# Patient Record
Sex: FEMALE | Race: WHITE | Employment: OTHER | URBAN - METROPOLITAN AREA
[De-identification: names, ages, dates, MRNs, and addresses within clinical notes are randomized per-mention and may not be internally consistent; named-entity substitution may affect disease eponyms.]

---

## 2017-01-23 ENCOUNTER — OP HISTORICAL/CONVERTED ENCOUNTER (OUTPATIENT)
Dept: OTHER | Age: 67
End: 2017-01-23

## 2017-08-16 ENCOUNTER — OP HISTORICAL/CONVERTED ENCOUNTER (OUTPATIENT)
Dept: OTHER | Age: 67
End: 2017-08-16

## 2017-09-12 ENCOUNTER — OP HISTORICAL/CONVERTED ENCOUNTER (OUTPATIENT)
Dept: OTHER | Age: 67
End: 2017-09-12

## 2017-09-15 ENCOUNTER — OP HISTORICAL/CONVERTED ENCOUNTER (OUTPATIENT)
Dept: OTHER | Age: 67
End: 2017-09-15

## 2018-06-22 ENCOUNTER — OP HISTORICAL/CONVERTED ENCOUNTER (OUTPATIENT)
Dept: OTHER | Age: 68
End: 2018-06-22

## 2018-06-26 ENCOUNTER — IP HISTORICAL/CONVERTED ENCOUNTER (OUTPATIENT)
Dept: OTHER | Age: 68
End: 2018-06-26

## 2018-08-17 ENCOUNTER — OP HISTORICAL/CONVERTED ENCOUNTER (OUTPATIENT)
Dept: OTHER | Age: 68
End: 2018-08-17

## 2020-02-05 ENCOUNTER — OP HISTORICAL/CONVERTED ENCOUNTER (OUTPATIENT)
Dept: OTHER | Age: 70
End: 2020-02-05

## 2020-02-24 ENCOUNTER — OP HISTORICAL/CONVERTED ENCOUNTER (OUTPATIENT)
Dept: OTHER | Age: 70
End: 2020-02-24

## 2020-03-02 ENCOUNTER — OP HISTORICAL/CONVERTED ENCOUNTER (OUTPATIENT)
Dept: OTHER | Age: 70
End: 2020-03-02

## 2020-07-15 PROBLEM — H92.09 OTALGIA: Status: ACTIVE | Noted: 2020-07-15

## 2020-07-15 PROBLEM — J30.9 ALLERGIC RHINITIS: Status: ACTIVE | Noted: 2020-07-15

## 2020-07-15 PROBLEM — K14.6 BURNING MOUTH SYNDROME: Status: ACTIVE | Noted: 2020-07-15

## 2020-08-03 VITALS
TEMPERATURE: 97.7 F | HEIGHT: 66 IN | HEART RATE: 72 BPM | SYSTOLIC BLOOD PRESSURE: 164 MMHG | BODY MASS INDEX: 36.35 KG/M2 | OXYGEN SATURATION: 96 % | DIASTOLIC BLOOD PRESSURE: 80 MMHG | WEIGHT: 226.2 LBS

## 2020-08-03 RX ORDER — ETANERCEPT 50 MG/ML
SOLUTION SUBCUTANEOUS
COMMUNITY

## 2020-08-03 RX ORDER — FUROSEMIDE 20 MG/1
TABLET ORAL DAILY
COMMUNITY

## 2020-08-03 RX ORDER — SUCRALFATE 1 G/10ML
SUSPENSION ORAL 4 TIMES DAILY
COMMUNITY

## 2020-08-03 RX ORDER — SILVER SULFADIAZINE 10 G/1000G
CREAM TOPICAL DAILY
COMMUNITY

## 2020-08-03 RX ORDER — HYDROXYCHLOROQUINE SULFATE 200 MG/1
200 TABLET, FILM COATED ORAL DAILY
COMMUNITY

## 2020-08-03 RX ORDER — TRIAMCINOLONE ACETONIDE 1 MG/G
PASTE DENTAL 2 TIMES DAILY
COMMUNITY

## 2020-08-03 RX ORDER — ONDANSETRON 4 MG/1
4 TABLET, FILM COATED ORAL
COMMUNITY

## 2020-08-03 RX ORDER — BETAMETHASONE DIPROPIONATE 0.5 MG/G
CREAM TOPICAL 2 TIMES DAILY
COMMUNITY

## 2020-08-03 RX ORDER — TRIAMCINOLONE ACETONIDE 1 MG/G
CREAM TOPICAL 2 TIMES DAILY
COMMUNITY

## 2020-08-03 RX ORDER — ALBUTEROL SULFATE 90 UG/1
AEROSOL, METERED RESPIRATORY (INHALATION)
COMMUNITY

## 2020-08-03 RX ORDER — CHOLESTYRAMINE LIGHT 4 G/5.7G
POWDER, FOR SUSPENSION ORAL
COMMUNITY

## 2020-08-03 RX ORDER — MECLIZINE HYDROCHLORIDE 25 MG/1
TABLET ORAL
COMMUNITY

## 2020-08-03 RX ORDER — LIFITEGRAST 50 MG/ML
SOLUTION/ DROPS OPHTHALMIC
COMMUNITY

## 2020-08-03 RX ORDER — LACTULOSE 10 G/15ML
SOLUTION ORAL; RECTAL 3 TIMES DAILY
COMMUNITY

## 2020-08-03 RX ORDER — FAMOTIDINE 20 MG/1
20 TABLET, FILM COATED ORAL 2 TIMES DAILY
COMMUNITY

## 2020-08-03 RX ORDER — CARVEDILOL 3.12 MG/1
TABLET ORAL 2 TIMES DAILY WITH MEALS
COMMUNITY

## 2020-08-03 RX ORDER — SACUBITRIL AND VALSARTAN 24; 26 MG/1; MG/1
1 TABLET, FILM COATED ORAL 2 TIMES DAILY
COMMUNITY

## 2020-08-03 RX ORDER — LEVOTHYROXINE SODIUM 25 UG/1
TABLET ORAL
COMMUNITY

## 2020-08-03 RX ORDER — METOCLOPRAMIDE 5 MG/1
5 TABLET ORAL
COMMUNITY

## 2020-08-03 RX ORDER — BUDESONIDE AND FORMOTEROL FUMARATE DIHYDRATE 160; 4.5 UG/1; UG/1
2 AEROSOL RESPIRATORY (INHALATION)
COMMUNITY

## 2020-08-03 RX ORDER — HYDROXYZINE 25 MG/1
TABLET, FILM COATED ORAL
COMMUNITY
End: 2021-05-07 | Stop reason: SDUPTHER

## 2020-08-03 RX ORDER — TRAZODONE HYDROCHLORIDE 50 MG/1
TABLET ORAL
COMMUNITY

## 2020-08-03 RX ORDER — MONTELUKAST SODIUM 10 MG/1
10 TABLET ORAL DAILY
COMMUNITY

## 2020-08-03 RX ORDER — SUCRALFATE 1 G/1
1 TABLET ORAL 4 TIMES DAILY
COMMUNITY

## 2020-08-03 RX ORDER — DEXLANSOPRAZOLE 60 MG/1
CAPSULE, DELAYED RELEASE ORAL
COMMUNITY

## 2020-08-03 RX ORDER — DICLOFENAC SODIUM 10 MG/G
GEL TOPICAL 4 TIMES DAILY
COMMUNITY

## 2020-08-11 ENCOUNTER — OFFICE VISIT (OUTPATIENT)
Dept: PODIATRY | Age: 70
End: 2020-08-11
Payer: MEDICARE

## 2020-08-11 VITALS
WEIGHT: 203.6 LBS | HEIGHT: 66 IN | TEMPERATURE: 98.2 F | HEART RATE: 88 BPM | DIASTOLIC BLOOD PRESSURE: 80 MMHG | SYSTOLIC BLOOD PRESSURE: 126 MMHG | BODY MASS INDEX: 32.72 KG/M2 | OXYGEN SATURATION: 97 %

## 2020-08-11 DIAGNOSIS — M77.41 METATARSALGIA OF BOTH FEET: ICD-10-CM

## 2020-08-11 DIAGNOSIS — M20.42 HAMMERTOE OF LEFT FOOT: Primary | ICD-10-CM

## 2020-08-11 DIAGNOSIS — G62.9 NEUROPATHY: ICD-10-CM

## 2020-08-11 DIAGNOSIS — M77.42 METATARSALGIA OF BOTH FEET: ICD-10-CM

## 2020-08-11 PROCEDURE — 99214 OFFICE O/P EST MOD 30 MIN: CPT | Performed by: PODIATRIST

## 2020-08-11 RX ORDER — PREGABALIN 50 MG/1
50 CAPSULE ORAL 3 TIMES DAILY
Qty: 90 CAP | Refills: 2 | Status: SHIPPED | OUTPATIENT
Start: 2020-08-11 | End: 2021-09-07

## 2020-08-17 ENCOUNTER — OFFICE VISIT (OUTPATIENT)
Dept: ENT CLINIC | Age: 70
End: 2020-08-17
Payer: MEDICARE

## 2020-08-17 VITALS
HEIGHT: 66 IN | TEMPERATURE: 97.1 F | BODY MASS INDEX: 32.95 KG/M2 | SYSTOLIC BLOOD PRESSURE: 120 MMHG | OXYGEN SATURATION: 97 % | HEART RATE: 84 BPM | RESPIRATION RATE: 18 BRPM | WEIGHT: 205 LBS | DIASTOLIC BLOOD PRESSURE: 70 MMHG

## 2020-08-17 DIAGNOSIS — H92.02 OTALGIA OF LEFT EAR: ICD-10-CM

## 2020-08-17 DIAGNOSIS — J30.1 NON-SEASONAL ALLERGIC RHINITIS DUE TO POLLEN: ICD-10-CM

## 2020-08-17 DIAGNOSIS — K14.6 BURNING MOUTH SYNDROME: Primary | ICD-10-CM

## 2020-08-17 PROCEDURE — 99213 OFFICE O/P EST LOW 20 MIN: CPT | Performed by: OTOLARYNGOLOGY

## 2020-08-17 NOTE — PROGRESS NOTES
Subjective:    Sherly Carty   72 y.o.   1950     HPI     Location - ear, nose    Quality - allergies, ear pain    Severity -  Mild/moderate    Duration - years    Timing - ongoing    Context - pt on IT, every other week now with concentrate, doing well nasal sore is better; having some left ear pain from her shoulder/neck pains    Modifying Features - allergies    Associated symptoms/signs -  Burning mouth      Review of Systems  Review of Systems   Constitutional: Negative for chills and fever. HENT: Positive for ear pain and tinnitus. Negative for hearing loss and nosebleeds. Eyes: Negative for blurred vision and double vision. Respiratory: Positive for shortness of breath. Negative for cough and sputum production. Cardiovascular: Negative for chest pain and palpitations. Gastrointestinal: Negative for heartburn, nausea and vomiting. Musculoskeletal: Positive for joint pain. Negative for neck pain. Skin: Negative. Neurological: Negative for dizziness, speech change, weakness and headaches. Endo/Heme/Allergies: Negative for environmental allergies. Does not bruise/bleed easily. Psychiatric/Behavioral: Negative for memory loss. The patient does not have insomnia. 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     Otalgia 7/15/2020    Other and unspecified hyperlipidemia     Other chronic pulmonary heart diseases     Other pulmonary embolism and infarction     2007/08    Tremors of nervous system      Prior to Admission medications    Medication Sig Start Date End Date Taking? Authorizing Provider   pregabalin (LYRICA) 50 mg capsule Take 1 Cap by mouth three (3) times daily. Max Daily Amount: 150 mg. 8/11/20   Eugene Latham DPM   betamethasone dipropionate (DIPROSONE) 0.05 % topical cream Apply  to affected area two (2) times a day. Provider, Historical   sucralfate (Carafate) 100 mg/mL suspension Take  by mouth four (4) times daily. Provider, Historical   carvediloL (COREG) 3.125 mg tablet Take  by mouth two (2) times daily (with meals). Provider, Historical   Cholestyramine-Aspartame (Cholestyramine Light) 4 gram powder Take  by mouth three (3) times daily (with meals). Provider, Historical   lactulose (Constulose) 10 gram/15 mL solution Take  by mouth three (3) times daily. Provider, Historical   ivabradine (Corlanor) 5 mg tablet Take  by mouth two (2) times daily (with meals). Provider, Historical   dexlansoprazole (Dexilant) 60 mg CpDB capsule (delayed release) Take  by mouth. Provider, Historical   diclofenac (VOLTAREN) 1 % gel Apply  to affected area four (4) times daily. Provider, Historical   etanercept (EnbreL) 50 mg/mL (1 mL) injection by SubCUTAneous route. Provider, Historical   sacubitriL-valsartan Narendra Duel) 24-26 mg tablet Take 1 Tab by mouth two (2) times a day. Provider, Historical   famotidine (PEPCID) 20 mg tablet Take 20 mg by mouth two (2) times a day. Provider, Historical   furosemide (LASIX) 20 mg tablet Take  by mouth daily. Provider, Historical   hydrOXYchloroQUINE (PLAQUENIL) 200 mg tablet Take 200 mg by mouth daily. Provider, Historical   hydrOXYzine HCL (ATARAX) 25 mg tablet Take  by mouth three (3) times daily as needed. Provider, Historical   levothyroxine (SYNTHROID) 25 mcg tablet Take  by mouth Daily (before breakfast).     Provider, Historical   meclizine (ANTIVERT) 25 mg tablet Take  by mouth three (3) times daily as needed for Dizziness. Provider, Historical   metoclopramide HCl (REGLAN) 5 mg tablet Take 5 mg by mouth Before breakfast, lunch, and dinner. Provider, Historical   montelukast (SINGULAIR) 10 mg tablet Take 10 mg by mouth daily. Provider, Historical   mirabegron ER (Myrbetriq) 50 mg ER tablet Take 50 mg by mouth daily. Provider, Historical   ondansetron hcl (ZOFRAN) 4 mg tablet Take 4 mg by mouth every eight (8) hours as needed for Nausea or Vomiting. Provider, Historical   silver sulfADIAZINE (SSD) 1 % topical cream Apply  to affected area daily. Provider, Historical   budesonide-formoteroL (Symbicort) 160-4.5 mcg/actuation HFAA Take 2 Puffs by inhalation. Provider, Historical   sucralfate (CARAFATE) 1 gram tablet Take 1 g by mouth four (4) times daily. Provider, Historical   traZODone (DESYREL) 50 mg tablet Take  by mouth nightly. Provider, Historical   triamcinolone acetonide (KENALOG) 0.1 % dental paste by Dental route two (2) times a day. Provider, Historical   triamcinolone acetonide (KENALOG) 0.1 % topical cream Apply  to affected area two (2) times a day. use thin layer    Provider, Historical   albuterol (Ventolin HFA) 90 mcg/actuation inhaler Take  by inhalation. Provider, Historical   rivaroxaban (Xarelto) 20 mg tab tablet Take  by mouth daily. Provider, Historical   lifitegrast (Xiidra) 5 % dpet Apply  to eye. Provider, Historical   DIOVAN 160 mg tablet TAKE ONE (1) TABLET, BY MOUTH, DAILY. 9/18/14   Eva Mcfarlane NP   cyclobenzaprine (FLEXERIL) 10 mg tablet Take  by mouth three (3) times daily as needed. 1/2 to 1 daily as needed    Provider, Historical   Hydrochlorothiazide 12.5 mg tablet Take 12.5 mg by mouth daily as needed. 4/25/13   Berhane Troncoso MD   HYDROcodone-acetaminophen 5-500 mg cap Take  by mouth as needed. Provider, Historical   escitalopram (LEXAPRO) 10 mg tablet Take 20 mg by mouth daily.     Provider, Historical   primidone (MYSOLINE) 50 mg tablet Take 250 mg by mouth two (2) times a day. Provider, Historical   zolpidem (AMBIEN) 10 mg tablet Take  by mouth nightly as needed. 1/2 to 1 at bedtime as needed    Provider, Historical   celecoxib (CELEBREX) 200 mg capsule Take  by mouth two (2) times a day. Provider, Historical   simvastatin (ZOCOR) 40 mg tablet Take  by mouth nightly. Provider, Historical   lansoprazole (PREVACID) 30 mg capsule Take  by mouth Daily (before breakfast). Provider, Historical   CALCIUM CARB/VIT D3/MINERALS (CALCIUM-VITAMIN D PO) Take 50,000 mg by mouth every seven (7) days. Provider, Historical   aspirin 81 mg tablet Take 81 mg by mouth. Provider, Historical   allopurinol (ZYLOPRIM) 300 mg tablet Take 100 mg by mouth two (2) times a day. Provider, Historical            Objective:     Visit Vitals  /70 (BP 1 Location: Left arm, BP Patient Position: Sitting)   Pulse 84   Temp 97.1 °F (36.2 °C) (Oral)   Resp 18   Ht 5' 6\" (1.676 m)   Wt 205 lb (93 kg)   SpO2 97%   BMI 33.09 kg/m²        Physical Exam  Vitals signs reviewed. Constitutional:       General: She is awake. She is not in acute distress. Appearance: Normal appearance. She is well-groomed. She is obese. HENT:      Head: Normocephalic and atraumatic. Jaw: There is normal jaw occlusion. No trismus, tenderness or malocclusion. Salivary Glands: Right salivary gland is not diffusely enlarged or tender. Left salivary gland is not diffusely enlarged or tender. Right Ear: Hearing, tympanic membrane, ear canal and external ear normal.      Left Ear: Hearing, tympanic membrane, ear canal and external ear normal.      Ears:      Villalobos exam findings: does not lateralize. Right Rinne: AC > BC. Left Rinne: AC > BC. Comments: Small area of bruising inferior left canal     Nose: No nasal deformity, septal deviation or mucosal edema. Right Nostril: No epistaxis. Left Nostril: No epistaxis.       Right Turbinates: Not enlarged, swollen or pale. Left Turbinates: Not enlarged, swollen or pale. Right Sinus: No maxillary sinus tenderness or frontal sinus tenderness. Left Sinus: No maxillary sinus tenderness or frontal sinus tenderness. Mouth/Throat:      Lips: Pink. No lesions. Mouth: Mucous membranes are moist. No oral lesions. Dentition: Normal dentition. No dental caries. Tongue: No lesions. Palate: No mass and lesions. Pharynx: Oropharynx is clear. Uvula midline. No oropharyngeal exudate or posterior oropharyngeal erythema. Tonsils: No tonsillar exudate. 0 on the right. 0 on the left. Eyes:      General: Vision grossly intact. Extraocular Movements: Extraocular movements intact. Right eye: No nystagmus. Left eye: No nystagmus. Conjunctiva/sclera: Conjunctivae normal.      Pupils: Pupils are equal, round, and reactive to light. Neck:      Musculoskeletal: No edema or erythema. Thyroid: No thyroid mass, thyromegaly or thyroid tenderness. Trachea: Trachea and phonation normal. No tracheal tenderness or tracheal deviation. Cardiovascular:      Rate and Rhythm: Normal rate and regular rhythm. Pulmonary:      Effort: Pulmonary effort is normal. No respiratory distress. Breath sounds: No stridor. Musculoskeletal: Normal range of motion. General: No swelling or tenderness. Lymphadenopathy:      Cervical: No cervical adenopathy. Skin:     General: Skin is warm and dry. Findings: No lesion or rash. Neurological:      General: No focal deficit present. Mental Status: She is alert and oriented to person, place, and time. Mental status is at baseline. Cranial Nerves: Cranial nerves are intact. Coordination: Romberg sign negative. Gait: Gait is intact. Comments: Negative Hallpike   Psychiatric:         Mood and Affect: Mood normal.         Behavior: Behavior normal. Behavior is cooperative. Assessment/Plan:     Encounter Diagnoses   Name Primary?  Burning mouth syndrome Yes    Non-seasonal allergic rhinitis due to pollen     Otalgia of left ear      Exam clear today  Pain may be from small bruising in canal but more likely referred from shoulder pain  Ok to extend IT to every 3 weeks, will need new serum    No orders of the defined types were placed in this encounter. Follow-up and Dispositions    · Return in about 6 months (around 2/17/2021).

## 2020-08-17 NOTE — LETTER
8/17/20 Patient: Amarjit Alvarado YOB: 1950 Date of Visit: 8/17/2020 Jose Spears MD 
[address] [facility] [place] [address] VIA Facsimile: [phone] Dear Jose Spears MD, Thank you for referring Ms. Donovan Bañuelos to [facility] EAR, NOSE, THROAT AND ALLERGY CARE for evaluation. My notes for this consultation are attached. If you have questions, please do not hesitate to call me. I look forward to following your patient along with you. Sincerely, Justice Issa MD

## 2020-08-18 ENCOUNTER — TELEPHONE (OUTPATIENT)
Dept: ENT CLINIC | Age: 70
End: 2020-08-18

## 2020-08-20 NOTE — PROGRESS NOTES
HISTORY OF PRESENT ILLNESS  Talha Malcolm is a 72 y.o. female is being seen in the office as a new patient complaining of burning/tingling to her feet and also multiple pedal deformities. Patient denies any recent trauma. She states imaging has not been performed to interrogate the area. She states the pain rises to the level of 8 out of 10 and is localized to both of her big toes. She states the pain is exacerbated with close toed shoes and ambulation. She denies any openings in the skin. She denies any local/systemic signs of infection. She denies any other lower extremity complaints    Review of Systems   Constitutional: Negative. HENT: Negative. Eyes: Negative. Respiratory: Negative. Cardiovascular: Positive for leg swelling. Gastrointestinal: Positive for heartburn and nausea. Genitourinary: Negative. Musculoskeletal: Positive for back pain and joint pain. Skin: Negative. Neurological: Positive for tingling and sensory change. Endo/Heme/Allergies: Bruises/bleeds easily. Psychiatric/Behavioral: Positive for depression. The patient is nervous/anxious. All other systems reviewed and are negative. Past Medical History:   Diagnosis Date    Allergic rhinitis 7/15/2020    Arthritis     Breast cancer ([facility] [address]     12/2010    Breast cancer (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     Otalgia 7/15/2020    Other and unspecified hyperlipidemia     Other chronic pulmonary heart diseases     Other pulmonary embolism and infarction     2007/08    Tremors of nervous system      Family History   Problem Relation Age of Onset    Diabetes Other     Cancer Other     Heart Attack Neg Hx     Sudden Death Neg Hx      Social History     Socioeconomic History    Marital status:      Spouse name: Not on file    Number of children: Not on file    Years of education: Not on file    Highest education level: Not on file   Occupational History    Not on file   Social Needs    Financial resource strain: Not on file    Food insecurity     Worry: Not on file     Inability: Not on file    Transportation needs     Medical: Not on file     Non-medical: Not on file   Tobacco Use    Smoking status: Never Smoker    Smokeless tobacco: Never Used   Substance and Sexual Activity    Alcohol use: No    Drug use: No    Sexual activity: Not on file   Lifestyle    Physical activity     Days per week: Not on file     Minutes per session: Not on file    Stress: Not on file   Relationships    Social connections     Talks on phone: Not on file     Gets together: Not on file     Attends Jewish service: Not on file     Active member of club or organization: Not on file     Attends meetings of clubs or organizations: Not on file     Relationship status: Not on file    Intimate partner violence     Fear of current or ex partner: Not on file     Emotionally abused: Not on file     Physically abused: Not on file     Forced sexual activity: Not on file   Other Topics Concern    Not on file   Social History Narrative    Not on file     Past Surgical History:   Procedure Laterality Date    HX BACK SURGERY      HX BREAST RECONSTRUCTION  01/2012    both    HX CHOLECYSTECTOMY      HX HEART VALVE SURGERY      HX HERNIA REPAIR      midline incisional herniorrhaphy    HX MASTECTOMY  02/2011    HX MAXILLOFACIAL      HX PACEMAKER       Current Outpatient Medications   Medication Instructions    albuterol (Ventolin HFA) 90 mcg/actuation inhaler Inhalation    allopurinoL (ZYLOPRIM) 100 mg, 2 TIMES DAILY    aspirin 81 mg    betamethasone dipropionate (DIPROSONE) 0.05 % topical cream Topical, 2 TIMES DAILY    budesonide-formoteroL (Symbicort) 160-4.5 mcg/actuation HFAA 2 Puffs, Inhalation    CALCIUM CARB/VIT D3/MINERALS (CALCIUM-VITAMIN D PO) 50,000 mg, EVERY 7 DAYS    carvediloL (COREG) 3.125 mg tablet Oral, 2 TIMES DAILY WITH MEALS    celecoxib (CELEBREX) 200 mg capsule 2 TIMES DAILY    Cholestyramine-Aspartame (Cholestyramine Light) 4 gram powder Oral, 3 TIMES DAILY WITH MEALS    cyclobenzaprine (FLEXERIL) 10 mg tablet 3 TIMES DAILY AS NEEDED    dexlansoprazole (Dexilant) 60 mg CpDB capsule (delayed release) Oral    diclofenac (VOLTAREN) 1 % gel Topical, 4 TIMES DAILY    DIOVAN 160 mg tablet TAKE ONE (1) TABLET, BY MOUTH, DAILY.     escitalopram oxalate (LEXAPRO) 20 mg, DAILY    etanercept (EnbreL) 50 mg/mL (1 mL) injection SubCUTAneous    famotidine (PEPCID) 20 mg, Oral, 2 TIMES DAILY    furosemide (LASIX) 20 mg tablet Oral, DAILY    hydroCHLOROthiazide (HYDRODIURIL) 12.5 mg, Oral, DAILY AS NEEDED    HYDROcodone-acetaminophen 5-500 mg cap AS NEEDED    hydrOXYchloroQUINE (PLAQUENIL) 200 mg, Oral, DAILY    hydrOXYzine HCL (ATARAX) 25 mg tablet Oral, 3 TIMES DAILY AS NEEDED    ivabradine (Corlanor) 5 mg tablet Oral, 2 TIMES DAILY WITH MEALS    lactulose (Constulose) 10 gram/15 mL solution Oral, 3 TIMES DAILY    lansoprazole (PREVACID) 30 mg capsule DAILY BEFORE BREAKFAST    levothyroxine (SYNTHROID) 25 mcg tablet Oral, DAILY BEFORE BREAKFAST    lifitegrast (Xiidra) 5 % dpet Ophthalmic    meclizine (ANTIVERT) 25 mg tablet Oral, 3 TIMES DAILY AS NEEDED    metoclopramide HCl (REGLAN) 5 mg, Oral, 3 TIMES DAILY BEFORE MEALS    mirabegron ER (MYRBETRIQ) 50 mg, Oral, DAILY    montelukast (SINGULAIR) 10 mg, Oral, DAILY    ondansetron hcl (ZOFRAN) 4 mg, Oral, EVERY 8 HOURS AS NEEDED    pregabalin (LYRICA) 50 mg, Oral, 3 TIMES DAILY    primidone (MYSOLINE) 250 mg, 2 TIMES DAILY    rivaroxaban (Xarelto) 20 mg tab tablet Oral, DAILY  sacubitriL-valsartan (Entresto) 24-26 mg tablet 1 Tab, Oral, 2 TIMES DAILY    silver sulfADIAZINE (SSD) 1 % topical cream Topical, DAILY    simvastatin (ZOCOR) 40 mg tablet EVERY BEDTIME    sucralfate (Carafate) 100 mg/mL suspension Oral, 4 TIMES DAILY    sucralfate (CARAFATE) 1 g, Oral, 4 TIMES DAILY    traZODone (DESYREL) 50 mg tablet Oral, EVERY BEDTIME    triamcinolone acetonide (KENALOG) 0.1 % dental paste Dental, 2 TIMES DAILY    triamcinolone acetonide (KENALOG) 0.1 % topical cream Topical, 2 TIMES DAILY, use thin layer     zolpidem (AMBIEN) 10 mg tablet BEDTIME PRN     Allergies   Allergen Reactions    Demerol [Meperidine] Other (comments)    Naprosyn [Naproxen] Not Reported This Time    Pcn [Penicillins] Not Reported This Time     Visit Vitals  /80 (BP 1 Location: Right arm, BP Patient Position: Sitting)   Pulse 88   Temp 98.2 °F (36.8 °C) (Temporal)   Ht 5' 6\" (1.676 m)   Wt 203 lb 9.6 oz (92.4 kg)   SpO2 97%   BMI 32.86 kg/m²     Physical Exam  Vitals signs reviewed. Constitutional:       Appearance: She is obese. HENT:      Head:      Comments: Normal dentition     Mouth/Throat:      Dentition: Normal dentition. Cardiovascular:      Pulses:           Dorsalis pedis pulses are 2+ on the right side and 2+ on the left side. Posterior tibial pulses are 2+ on the right side and 2+ on the left side. Pulmonary:      Effort: Pulmonary effort is normal.   Musculoskeletal:      Right lower leg: Edema present. Left lower leg: Edema present. Right foot: Decreased range of motion. Deformity and prominent metatarsal heads present. No bunion or Charcot foot. Left foot: Decreased range of motion. Deformity and prominent metatarsal heads present. No bunion or Charcot foot. Feet:      Right foot:      Protective Sensation: 10 sites tested. 0 sites sensed.       Skin integrity: Skin integrity normal.      Toenail Condition: Right toenails are normal.      Left foot: Protective Sensation: 10 sites tested. 0 sites sensed. Skin integrity: Skin integrity normal.      Toenail Condition: Left toenails are normal.   Skin:     General: Skin is warm. Capillary Refill: Capillary refill takes 2 to 3 seconds. Neurological:      Mental Status: She is alert and oriented to person, place, and time. Psychiatric:         Mood and Affect: Mood and affect normal.         Behavior: Behavior is cooperative. ASSESSMENT and PLAN    ICD-10-CM ICD-9-CM    1. Hammertoe of left foot  M20.42 735.4 XR FOOT LT MIN 3 V   2. Neuropathy  G62.9 355.9 pregabalin (LYRICA) 50 mg capsule   3. Metatarsalgia of both feet  M77.41 726.70     M77.42           Discussed and educated patient regarding her current medical condition  Prescription given for symptomatic relief from the burning/tingling in her feet, Lyrica 50 mg 3 times daily. Instructed patient dosing changes may be needed in the near future and she is to monitor  Prescription given for x-ray to be performed of the left foot to evaluate her pedal deformities. Will discuss treatment options in more depth when imaging performed  Instructed patient to monitor their feet daily, be compliant with all medications and wear supportive shoe gear.

## 2020-08-21 ENCOUNTER — OP HISTORICAL/CONVERTED ENCOUNTER (OUTPATIENT)
Dept: OTHER | Age: 70
End: 2020-08-21

## 2020-09-01 ENCOUNTER — OFFICE VISIT (OUTPATIENT)
Dept: ENT CLINIC | Age: 70
End: 2020-09-01
Payer: MEDICARE

## 2020-09-01 VITALS — DIASTOLIC BLOOD PRESSURE: 68 MMHG | SYSTOLIC BLOOD PRESSURE: 112 MMHG

## 2020-09-01 DIAGNOSIS — J30.1 ALLERGIC RHINITIS DUE TO POLLEN, UNSPECIFIED SEASONALITY: Primary | ICD-10-CM

## 2020-09-01 PROCEDURE — 95117 IMMUNOTHERAPY INJECTIONS: CPT | Performed by: OTOLARYNGOLOGY

## 2020-09-01 NOTE — PROGRESS NOTES
Patient came in today for new serum vial test, patient received 0.02 ml, GORDON, intraderamal, 7 mm reaction, 2 vials.

## 2020-09-11 DIAGNOSIS — M20.42 HAMMERTOE OF LEFT FOOT: ICD-10-CM

## 2020-10-14 ENCOUNTER — OFFICE VISIT (OUTPATIENT)
Dept: PODIATRY | Age: 70
End: 2020-10-14
Payer: MEDICARE

## 2020-10-14 VITALS
DIASTOLIC BLOOD PRESSURE: 76 MMHG | HEART RATE: 85 BPM | WEIGHT: 202.8 LBS | TEMPERATURE: 97.7 F | SYSTOLIC BLOOD PRESSURE: 115 MMHG | HEIGHT: 66 IN | BODY MASS INDEX: 32.59 KG/M2 | OXYGEN SATURATION: 95 %

## 2020-10-14 DIAGNOSIS — S93.622S LISFRANC'S SPRAIN, LEFT, SEQUELA: Primary | ICD-10-CM

## 2020-10-14 PROCEDURE — 99213 OFFICE O/P EST LOW 20 MIN: CPT | Performed by: PODIATRIST

## 2020-10-14 NOTE — PROGRESS NOTES
HISTORY OF PRESENT ILLNESS  Rick Alejandra is a 72 y.o. female is being seen in the office as a returning patient in the office complaining of left foot pain. Patient states she is gone for x-rays and would like to discuss the findings. She states she is suffering from similar complaints since her last office visit, pain rising the level of 8 out of 10. Patient states she is taking the Lyrica that was prescribed last office visit but she has not seen any improvement in her symptoms as of yet. She denies any recent trauma. She denies any local/systemic signs infection. She denies any other extremity complaints    Review of Systems   Constitutional: Negative. HENT: Negative. Eyes: Negative. Respiratory: Negative. Cardiovascular: Positive for leg swelling. Gastrointestinal: Positive for heartburn and nausea. Genitourinary: Negative. Musculoskeletal: Positive for back pain and joint pain. Skin: Negative. Neurological: Positive for tingling and sensory change. Endo/Heme/Allergies: Bruises/bleeds easily. Psychiatric/Behavioral: Positive for depression. The patient is nervous/anxious. All other systems reviewed and are negative. 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     Otalgia 7/15/2020    Other and unspecified hyperlipidemia     Other chronic pulmonary heart diseases     Other pulmonary embolism and infarction     2007/08    Tremors of nervous system      Family History Problem Relation Age of Onset    Diabetes Other     Cancer Other     Heart Attack Neg Hx     Sudden Death Neg Hx      Social History     Socioeconomic History    Marital status:      Spouse name: Not on file    Number of children: Not on file    Years of education: Not on file    Highest education level: Not on file   Occupational History    Not on file   Social Needs    Financial resource strain: Not on file    Food insecurity     Worry: Not on file     Inability: Not on file    Transportation needs     Medical: Not on file     Non-medical: Not on file   Tobacco Use    Smoking status: Never Smoker    Smokeless tobacco: Never Used   Substance and Sexual Activity    Alcohol use: No    Drug use: No    Sexual activity: Not on file   Lifestyle    Physical activity     Days per week: Not on file     Minutes per session: Not on file    Stress: Not on file   Relationships    Social connections     Talks on phone: Not on file     Gets together: Not on file     Attends [employer] service: Not on file     Active member of club or organization: Not on file     Attends meetings of clubs or organizations: Not on file     Relationship status: Not on file    Intimate partner violence     Fear of current or ex partner: Not on file     Emotionally abused: Not on file     Physically abused: Not on file     Forced sexual activity: Not on file   Other Topics Concern    Not on file   Social History Narrative    Not on file     Past Surgical History:   Procedure Laterality Date    HX BACK SURGERY      HX BREAST RECONSTRUCTION  01/2012    both    HX CHOLECYSTECTOMY      HX HEART VALVE SURGERY      HX HERNIA REPAIR      midline incisional herniorrhaphy    HX MASTECTOMY  02/2011    HX MAXILLOFACIAL      HX PACEMAKER       Current Outpatient Medications   Medication Instructions    albuterol (Ventolin HFA) 90 mcg/actuation inhaler Inhalation    allopurinoL (ZYLOPRIM) 100 mg, 2 TIMES DAILY    aspirin 81 mg    betamethasone dipropionate (DIPROSONE) 0.05 % topical cream Topical, 2 TIMES DAILY    budesonide-formoteroL (Symbicort) 160-4.5 mcg/actuation HFAA 2 Puffs, Inhalation    CALCIUM CARB/VIT D3/MINERALS (CALCIUM-VITAMIN D PO) 50,000 mg, EVERY 7 DAYS    carvediloL (COREG) 3.125 mg tablet Oral, 2 TIMES DAILY WITH MEALS    celecoxib (CELEBREX) 200 mg capsule 2 TIMES DAILY    Cholestyramine-Aspartame (Cholestyramine Light) 4 gram powder Oral, 3 TIMES DAILY WITH MEALS    cyclobenzaprine (FLEXERIL) 10 mg tablet 3 TIMES DAILY AS NEEDED    dexlansoprazole (Dexilant) 60 mg CpDB capsule (delayed release) Oral    diclofenac (VOLTAREN) 1 % gel Topical, 4 TIMES DAILY    DIOVAN 160 mg tablet TAKE ONE (1) TABLET, BY MOUTH, DAILY.     escitalopram oxalate (LEXAPRO) 20 mg, DAILY    etanercept (EnbreL) 50 mg/mL (1 mL) injection SubCUTAneous    famotidine (PEPCID) 20 mg, Oral, 2 TIMES DAILY    furosemide (LASIX) 20 mg tablet Oral, DAILY    hydroCHLOROthiazide (HYDRODIURIL) 12.5 mg, Oral, DAILY AS NEEDED    HYDROcodone-acetaminophen 5-500 mg cap AS NEEDED    hydrOXYchloroQUINE (PLAQUENIL) 200 mg, Oral, DAILY    hydrOXYzine HCL (ATARAX) 25 mg tablet Oral, 3 TIMES DAILY AS NEEDED    ivabradine (Corlanor) 5 mg tablet Oral, 2 TIMES DAILY WITH MEALS    lactulose (Constulose) 10 gram/15 mL solution Oral, 3 TIMES DAILY    lansoprazole (PREVACID) 30 mg capsule DAILY BEFORE BREAKFAST    levothyroxine (SYNTHROID) 25 mcg tablet Oral, DAILY BEFORE BREAKFAST    lifitegrast (Xiidra) 5 % dpet Ophthalmic    meclizine (ANTIVERT) 25 mg tablet Oral, 3 TIMES DAILY AS NEEDED    metoclopramide HCl (REGLAN) 5 mg, Oral, 3 TIMES DAILY BEFORE MEALS    mirabegron ER (MYRBETRIQ) 50 mg, Oral, DAILY    montelukast (SINGULAIR) 10 mg, Oral, DAILY    ondansetron hcl (ZOFRAN) 4 mg, Oral, EVERY 8 HOURS AS NEEDED    pregabalin (LYRICA) 50 mg, Oral, 3 TIMES DAILY    primidone (MYSOLINE) 250 mg, 2 TIMES DAILY    rivaroxaban (Xarelto) 20 mg tab tablet Oral, DAILY    sacubitriL-valsartan (Entresto) 24-26 mg tablet 1 Tab, Oral, 2 TIMES DAILY    silver sulfADIAZINE (SSD) 1 % topical cream Topical, DAILY    simvastatin (ZOCOR) 40 mg tablet EVERY BEDTIME    sucralfate (Carafate) 100 mg/mL suspension Oral, 4 TIMES DAILY    sucralfate (CARAFATE) 1 g, Oral, 4 TIMES DAILY    traZODone (DESYREL) 50 mg tablet Oral, EVERY BEDTIME    triamcinolone acetonide (KENALOG) 0.1 % dental paste Dental, 2 TIMES DAILY    triamcinolone acetonide (KENALOG) 0.1 % topical cream Topical, 2 TIMES DAILY, use thin layer     zolpidem (AMBIEN) 10 mg tablet BEDTIME PRN     Allergies   Allergen Reactions    Meperidine Other (comments)     Other reaction(s): psychological reaction  HALLUCINATIONS      Naproxen Not Reported This Time, Hives and Swelling    Penicillins Not Reported This Time, Hives and Swelling     Visit Vitals  /76 (BP 1 Location: Left arm, BP Patient Position: Sitting)   Pulse 85   Temp 97.7 °F (36.5 °C) (Temporal)   Ht 5' 6\" (1.676 m)   Wt 202 lb 12.8 oz (92 kg)   SpO2 95%   BMI 32.73 kg/m²     Physical Exam  Vitals signs reviewed. Constitutional:       Appearance: She is obese. HENT:      Head:      Comments: Normal dentition     Mouth/Throat:      Dentition: Normal dentition. Cardiovascular:      Pulses:           Dorsalis pedis pulses are 2+ on the right side and 2+ on the left side. Posterior tibial pulses are 2+ on the right side and 2+ on the left side. Pulmonary:      Effort: Pulmonary effort is normal.   Musculoskeletal:      Right lower leg: Edema present. Left lower leg: Edema present. Right foot: Decreased range of motion. Deformity and prominent metatarsal heads present. No bunion or Charcot foot. Left foot: Decreased range of motion. Deformity and prominent metatarsal heads present. No bunion or Charcot foot. Feet:      Right foot:      Protective Sensation: 10 sites tested. 0 sites sensed.       Skin integrity: Skin integrity normal.      Toenail Condition: Right toenails are normal.      Left foot:      Protective Sensation: 10 sites tested. 0 sites sensed. Skin integrity: Skin integrity normal.      Toenail Condition: Left toenails are normal.   Skin:     General: Skin is warm. Capillary Refill: Capillary refill takes 2 to 3 seconds. Neurological:      Mental Status: She is alert and oriented to person, place, and time. Psychiatric:         Mood and Affect: Mood and affect normal.         Behavior: Behavior is cooperative. ASSESSMENT and PLAN  Lisfranc sprain, Left Foot    Discussed and educated patient regarding her current medical condition  Patient to continue with the Lyrica 50 mg 3 times daily for symptomatic relief  Personally reviewed x-ray images of the left foot noting widening at the Lisfranc joint, possible Lisfranc ligament rupture or sprain. Discussed treatment options and patient elected for additional imaging. A prescription was given for an MRI to evaluate the integrity of the ligament. Will discuss treatment options in more depth when imaging performed  Instructed patient to monitor their feet daily, be compliant with all medications and wear supportive shoe gear.

## 2020-10-20 ENCOUNTER — TELEPHONE (OUTPATIENT)
Dept: PODIATRY | Age: 70
End: 2020-10-20

## 2020-10-20 DIAGNOSIS — M79.672 LEFT FOOT PAIN: Primary | ICD-10-CM

## 2020-10-22 ENCOUNTER — TELEPHONE (OUTPATIENT)
Dept: ENT CLINIC | Age: 70
End: 2020-10-22

## 2020-10-22 DIAGNOSIS — M79.672 LEFT FOOT PAIN: ICD-10-CM

## 2020-10-26 ENCOUNTER — TRANSCRIBE ORDER (OUTPATIENT)
Dept: EMERGENCY DEPT | Age: 70
End: 2020-10-26

## 2020-11-02 ENCOUNTER — HOSPITAL ENCOUNTER (OUTPATIENT)
Dept: GENERAL RADIOLOGY | Age: 70
Discharge: HOME | End: 2020-11-02
Attending: PODIATRIST
Payer: MEDICARE

## 2020-11-02 ENCOUNTER — TRANSCRIBE ORDER (OUTPATIENT)
Dept: EMERGENCY DEPT | Age: 70
End: 2020-11-02

## 2020-11-02 ENCOUNTER — HOSPITAL ENCOUNTER (OUTPATIENT)
Dept: CT IMAGING | Age: 70
Discharge: HOME | End: 2020-11-02
Attending: PODIATRIST
Payer: MEDICARE

## 2020-11-02 DIAGNOSIS — I48.0 PAROXYSMAL ATRIAL FIBRILLATION (HCC): Primary | ICD-10-CM

## 2020-11-02 DIAGNOSIS — I48.0 PAROXYSMAL ATRIAL FIBRILLATION (HCC): ICD-10-CM

## 2020-11-02 PROCEDURE — 73700 CT LOWER EXTREMITY W/O DYE: CPT

## 2020-11-02 PROCEDURE — 71046 X-RAY EXAM CHEST 2 VIEWS: CPT

## 2020-11-17 ENCOUNTER — OFFICE VISIT (OUTPATIENT)
Dept: PODIATRY | Age: 70
End: 2020-11-17
Payer: MEDICARE

## 2020-11-17 VITALS — TEMPERATURE: 97.1 F | BODY MASS INDEX: 32.24 KG/M2 | HEIGHT: 66 IN | WEIGHT: 200.6 LBS

## 2020-11-17 DIAGNOSIS — M76.72 PERONEAL TENDONITIS OF LEFT LOWER EXTREMITY: Primary | ICD-10-CM

## 2020-11-17 DIAGNOSIS — M76.62 TENDONITIS, ACHILLES, LEFT: ICD-10-CM

## 2020-11-17 PROCEDURE — 99213 OFFICE O/P EST LOW 20 MIN: CPT | Performed by: PODIATRIST

## 2020-11-23 NOTE — PROGRESS NOTES
HISTORY OF PRESENT ILLNESS  Jonas Garcia is a 72 y.o. female is being seen in the office as a returning patient in the office complaining of left foot pain. Patient states she is gone for her CT and would like to discuss the findings. She states she is suffering from similar complaints since her last office visit, pain rising the level of 5 out of 10. She denies any recent trauma. She denies any local/systemic signs infection. She denies any other extremity complaints    Review of Systems   Constitutional: Negative. HENT: Negative. Eyes: Negative. Respiratory: Negative. Cardiovascular: Positive for leg swelling. Gastrointestinal: Positive for heartburn and nausea. Genitourinary: Negative. Musculoskeletal: Positive for back pain and joint pain. Skin: Negative. Neurological: Positive for tingling and sensory change. Endo/Heme/Allergies: Bruises/bleeds easily. Psychiatric/Behavioral: Positive for depression. The patient is nervous/anxious. All other systems reviewed and are negative. 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     Otalgia 7/15/2020    Other and unspecified hyperlipidemia     Other chronic pulmonary heart diseases     Other pulmonary embolism and infarction     2007/08    Tremors of nervous system      Family History   Problem Relation Age of Onset    Diabetes Other     Cancer Other     Heart Attack Neg Hx     Sudden Death Neg Hx      Social History Socioeconomic History    Marital status:      Spouse name: Not on file    Number of children: Not on file    Years of education: Not on file    Highest education level: Not on file   Occupational History    Not on file   Social Needs    Financial resource strain: Not on file    Food insecurity     Worry: Not on file     Inability: Not on file    Transportation needs     Medical: Not on file     Non-medical: Not on file   Tobacco Use    Smoking status: Never Smoker    Smokeless tobacco: Never Used   Substance and Sexual Activity    Alcohol use: No    Drug use: No    Sexual activity: Not on file   Lifestyle    Physical activity     Days per week: Not on file     Minutes per session: Not on file    Stress: Not on file   Relationships    Social connections     Talks on phone: Not on file     Gets together: Not on file     Attends Mandaen service: Not on file     Active member of club or organization: Not on file     Attends meetings of clubs or organizations: Not on file     Relationship status: Not on file    Intimate partner violence     Fear of current or ex partner: Not on file     Emotionally abused: Not on file     Physically abused: Not on file     Forced sexual activity: Not on file   Other Topics Concern    Not on file   Social History Narrative    Not on file     Past Surgical History:   Procedure Laterality Date    HX BACK SURGERY      HX BREAST RECONSTRUCTION  01/2012    both    HX CHOLECYSTECTOMY      HX HEART VALVE SURGERY      HX HERNIA REPAIR      midline incisional herniorrhaphy    HX MASTECTOMY  02/2011    HX MAXILLOFACIAL      HX PACEMAKER       Current Outpatient Medications   Medication Instructions    albuterol (Ventolin HFA) 90 mcg/actuation inhaler Inhalation    allopurinoL (ZYLOPRIM) 100 mg, 2 TIMES DAILY    aspirin 81 mg    betamethasone dipropionate (DIPROSONE) 0.05 % topical cream Topical, 2 TIMES DAILY    budesonide-formoteroL (Symbicort) 160-4.5 mcg/actuation HFAA 2 Puffs, Inhalation    CALCIUM CARB/VIT D3/MINERALS (CALCIUM-VITAMIN D PO) 50,000 mg, EVERY 7 DAYS    carvediloL (COREG) 3.125 mg tablet Oral, 2 TIMES DAILY WITH MEALS    celecoxib (CELEBREX) 200 mg capsule 2 TIMES DAILY    Cholestyramine-Aspartame (Cholestyramine Light) 4 gram powder Oral, 3 TIMES DAILY WITH MEALS    cyclobenzaprine (FLEXERIL) 10 mg tablet 3 TIMES DAILY AS NEEDED    dexlansoprazole (Dexilant) 60 mg CpDB capsule (delayed release) Oral    diclofenac (VOLTAREN) 1 % gel Topical, 4 TIMES DAILY    DIOVAN 160 mg tablet TAKE ONE (1) TABLET, BY MOUTH, DAILY.     escitalopram oxalate (LEXAPRO) 20 mg, DAILY    etanercept (EnbreL) 50 mg/mL (1 mL) injection SubCUTAneous    famotidine (PEPCID) 20 mg, Oral, 2 TIMES DAILY    furosemide (LASIX) 20 mg tablet Oral, DAILY    hydroCHLOROthiazide (HYDRODIURIL) 12.5 mg, Oral, DAILY AS NEEDED    HYDROcodone-acetaminophen 5-500 mg cap AS NEEDED    hydrOXYchloroQUINE (PLAQUENIL) 200 mg, Oral, DAILY    hydrOXYzine HCL (ATARAX) 25 mg tablet Oral, 3 TIMES DAILY AS NEEDED    ivabradine (Corlanor) 5 mg tablet Oral, 2 TIMES DAILY WITH MEALS    lactulose (Constulose) 10 gram/15 mL solution Oral, 3 TIMES DAILY    lansoprazole (PREVACID) 30 mg capsule DAILY BEFORE BREAKFAST    levothyroxine (SYNTHROID) 25 mcg tablet Oral, DAILY BEFORE BREAKFAST    lifitegrast (Xiidra) 5 % dpet Ophthalmic    meclizine (ANTIVERT) 25 mg tablet Oral, 3 TIMES DAILY AS NEEDED    metoclopramide HCl (REGLAN) 5 mg, Oral, 3 TIMES DAILY BEFORE MEALS    mirabegron ER (MYRBETRIQ) 50 mg, Oral, DAILY    montelukast (SINGULAIR) 10 mg, Oral, DAILY    ondansetron hcl (ZOFRAN) 4 mg, Oral, EVERY 8 HOURS AS NEEDED    pregabalin (LYRICA) 50 mg, Oral, 3 TIMES DAILY    primidone (MYSOLINE) 250 mg, 2 TIMES DAILY    rivaroxaban (Xarelto) 20 mg tab tablet Oral, DAILY    sacubitriL-valsartan (Entresto) 24-26 mg tablet 1 Tab, Oral, 2 TIMES DAILY    silver sulfADIAZINE (SSD) 1 % topical cream Topical, DAILY    simvastatin (ZOCOR) 40 mg tablet EVERY BEDTIME    sucralfate (Carafate) 100 mg/mL suspension Oral, 4 TIMES DAILY    sucralfate (CARAFATE) 1 g, Oral, 4 TIMES DAILY    traZODone (DESYREL) 50 mg tablet Oral, EVERY BEDTIME    triamcinolone acetonide (KENALOG) 0.1 % dental paste Dental, 2 TIMES DAILY    triamcinolone acetonide (KENALOG) 0.1 % topical cream Topical, 2 TIMES DAILY, use thin layer     zolpidem (AMBIEN) 10 mg tablet BEDTIME PRN     Allergies   Allergen Reactions    Meperidine Other (comments)     Other reaction(s): psychological reaction  HALLUCINATIONS      Naproxen Not Reported This Time, Hives and Swelling    Penicillins Not Reported This Time, Hives and Swelling     Visit Vitals  Temp 97.1 °F (36.2 °C) (Temporal)   Ht 5' 6\" (1.676 m)   Wt 200 lb 9.6 oz (91 kg)   BMI 32.38 kg/m²     Physical Exam  Vitals signs reviewed. Constitutional:       Appearance: She is obese. HENT:      Head:      Comments: Normal dentition     Mouth/Throat:      Dentition: Normal dentition. Cardiovascular:      Pulses:           Dorsalis pedis pulses are 2+ on the right side and 2+ on the left side. Posterior tibial pulses are 2+ on the right side and 2+ on the left side. Pulmonary:      Effort: Pulmonary effort is normal.   Musculoskeletal:      Right lower leg: Edema present. Left lower leg: Edema present. Right foot: Decreased range of motion. Deformity and prominent metatarsal heads present. No bunion or Charcot foot. Left foot: Decreased range of motion. Deformity and prominent metatarsal heads present. No bunion or Charcot foot. Feet:      Right foot:      Protective Sensation: 10 sites tested. 0 sites sensed. Skin integrity: Skin integrity normal.      Toenail Condition: Right toenails are normal.      Left foot:      Protective Sensation: 10 sites tested. 0 sites sensed.       Skin integrity: Skin integrity normal.      Toenail Condition: Left toenails are normal.   Skin:     General: Skin is warm. Capillary Refill: Capillary refill takes 2 to 3 seconds. Neurological:      Mental Status: She is alert and oriented to person, place, and time. Psychiatric:         Mood and Affect: Mood and affect normal.         Behavior: Behavior is cooperative. ASSESSMENT and PLAN  Peroneal Tendonitis, LLE  Achilles Tendonitis, LLE      Discussed and educated patient regarding her current medical condition  Patient to continue with the Lyrica 50 mg 3 times daily for symptomatic relief  Personally reviewed CT images of the left foot/ankle noting chronic soft tissue injury with tendonitis. No osseous abnormality.   Patient to continue with oral antiinflammatories/pain medication  Patient was dispensed a lace up ankle brace to utilize for protection during ambulation  Will initiate physical therapy once pain has decreased

## 2021-02-15 ENCOUNTER — APPOINTMENT (OUTPATIENT)
Dept: CT IMAGING | Age: 71
End: 2021-02-15
Attending: FAMILY MEDICINE
Payer: MEDICARE

## 2021-02-15 ENCOUNTER — APPOINTMENT (OUTPATIENT)
Dept: GENERAL RADIOLOGY | Age: 71
End: 2021-02-15
Attending: FAMILY MEDICINE
Payer: MEDICARE

## 2021-02-15 ENCOUNTER — HOSPITAL ENCOUNTER (EMERGENCY)
Age: 71
Discharge: HOME | End: 2021-02-15
Attending: FAMILY MEDICINE
Payer: MEDICARE

## 2021-02-15 VITALS
RESPIRATION RATE: 16 BRPM | OXYGEN SATURATION: 100 % | TEMPERATURE: 97.7 F | DIASTOLIC BLOOD PRESSURE: 68 MMHG | HEART RATE: 80 BPM | WEIGHT: 200 LBS | BODY MASS INDEX: 32.14 KG/M2 | SYSTOLIC BLOOD PRESSURE: 135 MMHG | HEIGHT: 66 IN

## 2021-02-15 DIAGNOSIS — S59.912A FOREARM INJURY, LEFT, INITIAL ENCOUNTER: ICD-10-CM

## 2021-02-15 DIAGNOSIS — S09.90XA MINOR HEAD INJURY, INITIAL ENCOUNTER: Primary | ICD-10-CM
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
## 2021-02-15 LAB
ALBUMIN SERPL-MCNC: 3.8 G/DL (ref 3.5–5)
ALBUMIN/GLOB SERPL: 1.1 {RATIO} (ref 1.1–2.2)
ALP SERPL-CCNC: 144 U/L (ref 45–117)
ALT SERPL-CCNC: 16 U/L (ref 12–78)
ANION GAP SERPL CALC-SCNC: 9 MMOL/L (ref 5–15)
AST SERPL W P-5'-P-CCNC: 14 U/L (ref 15–37)
ATRIAL RATE: 74 BPM
BASOPHILS # BLD: 0 K/UL (ref 0–0.1)
BASOPHILS NFR BLD: 0 % (ref 0–1)
BILIRUB SERPL-MCNC: 0.4 MG/DL (ref 0.2–1)
BUN SERPL-MCNC: 17 MG/DL (ref 6–20)
BUN/CREAT SERPL: 17 (ref 12–20)
CA-I BLD-MCNC: 9.4 MG/DL (ref 8.5–10.1)
CALCULATED R AXIS, ECG10: 89 DEGREES
CALCULATED T AXIS, ECG11: 43 DEGREES
CHLORIDE SERPL-SCNC: 104 MMOL/L (ref 97–108)
CO2 SERPL-SCNC: 28 MMOL/L (ref 21–32)
CREAT SERPL-MCNC: 1 MG/DL (ref 0.55–1.02)
DIAGNOSIS, 93000: NORMAL
DIFFERENTIAL METHOD BLD: ABNORMAL
EOSINOPHIL # BLD: 0.2 K/UL (ref 0–0.4)
EOSINOPHIL NFR BLD: 4 % (ref 0–7)
ERYTHROCYTE [DISTWIDTH] IN BLOOD BY AUTOMATED COUNT: 14 % (ref 11.5–14.5)
GLOBULIN SER CALC-MCNC: 3.5 G/DL (ref 2–4)
GLUCOSE SERPL-MCNC: 138 MG/DL (ref 65–100)
HCT VFR BLD AUTO: 31.7 % (ref 35–47)
HGB BLD-MCNC: 10.2 G/DL (ref 11.5–16)
IMM GRANULOCYTES # BLD AUTO: 0 K/UL (ref 0–0.04)
IMM GRANULOCYTES NFR BLD AUTO: 0 % (ref 0–0.5)
INR PPP: 1.2 (ref 0.9–1.1)
LYMPHOCYTES # BLD: 0.9 K/UL (ref 0.8–3.5)
LYMPHOCYTES NFR BLD: 18 % (ref 12–49)
MCH RBC QN AUTO: 31.4 PG (ref 26–34)
MCHC RBC AUTO-ENTMCNC: 32.2 G/DL (ref 30–36.5)
MCV RBC AUTO: 97.5 FL (ref 80–99)
MONOCYTES # BLD: 0.3 K/UL (ref 0–1)
MONOCYTES NFR BLD: 6 % (ref 5–13)
NEUTS SEG # BLD: 3.6 K/UL (ref 1.8–8)
NEUTS SEG NFR BLD: 72 % (ref 32–75)
PLATELET # BLD AUTO: 192 K/UL (ref 150–400)
PMV BLD AUTO: 10.7 FL (ref 8.9–12.9)
POTASSIUM SERPL-SCNC: 3.7 MMOL/L (ref 3.5–5.1)
PROT SERPL-MCNC: 7.3 G/DL (ref 6.4–8.2)
PROTHROMBIN TIME: 12.4 SEC (ref 9–11.1)
Q-T INTERVAL, ECG07: 520 MS
QRS DURATION, ECG06: 180 MS
QTC CALCULATION (BEZET), ECG08: 577 MS
RBC # BLD AUTO: 3.25 M/UL (ref 3.8–5.2)
SODIUM SERPL-SCNC: 141 MMOL/L (ref 136–145)
VENTRICULAR RATE, ECG03: 74 BPM
WBC # BLD AUTO: 5 K/UL (ref 3.6–11)

## 2021-02-15 PROCEDURE — 99284 EMERGENCY DEPT VISIT MOD MDM: CPT

## 2021-02-15 PROCEDURE — 80053 COMPREHEN METABOLIC PANEL: CPT

## 2021-02-15 PROCEDURE — 85025 COMPLETE CBC W/AUTO DIFF WBC: CPT

## 2021-02-15 PROCEDURE — 36415 COLL VENOUS BLD VENIPUNCTURE: CPT

## 2021-02-15 PROCEDURE — 93005 ELECTROCARDIOGRAM TRACING: CPT

## 2021-02-15 PROCEDURE — 70450 CT HEAD/BRAIN W/O DYE: CPT

## 2021-02-15 PROCEDURE — 73090 X-RAY EXAM OF FOREARM: CPT

## 2021-02-15 PROCEDURE — 85610 PROTHROMBIN TIME: CPT

## 2021-02-15 PROCEDURE — 73080 X-RAY EXAM OF ELBOW: CPT

## 2021-02-15 PROCEDURE — 72125 CT NECK SPINE W/O DYE: CPT

## 2021-02-15 RX ORDER — HYDROCODONE BITARTRATE AND ACETAMINOPHEN 5; 325 MG/1; MG/1
1 TABLET ORAL
Qty: 10 TAB | Refills: 0 | Status: SHIPPED | OUTPATIENT
Start: 2021-02-15 | End: 2021-02-15

## 2021-02-15 NOTE — DISCHARGE INSTRUCTIONS
Thank you! Thank you for allowing me to care for you in the emergency department. I sincerely hope that you are satisfied with your visit today. It is my goal to provide you with excellent care. Below you will find a list of your labs and imaging from your visit today. Should you have any questions regarding these results please do not hesitate to call the emergency department. Labs -     Recent Results (from the past 12 hour(s))   CBC WITH AUTOMATED DIFF    Collection Time: 02/15/21 12:15 PM   Result Value Ref Range    WBC 5.0 3.6 - 11.0 K/uL    RBC 3.25 (L) 3.80 - 5.20 M/uL    HGB 10.2 (L) 11.5 - 16.0 g/dL    HCT 31.7 (L) 35.0 - 47.0 %    MCV 97.5 80.0 - 99.0 FL    MCH 31.4 26.0 - 34.0 PG    MCHC 32.2 30.0 - 36.5 g/dL    RDW 14.0 11.5 - 14.5 %    PLATELET 036 349 - 129 K/uL    MPV 10.7 8.9 - 12.9 FL    NEUTROPHILS 72 32 - 75 %    LYMPHOCYTES 18 12 - 49 %    MONOCYTES 6 5 - 13 %    EOSINOPHILS 4 0 - 7 %    BASOPHILS 0 0 - 1 %    IMMATURE GRANULOCYTES 0 0.0 - 0.5 %    ABS. NEUTROPHILS 3.6 1.8 - 8.0 K/UL    ABS. LYMPHOCYTES 0.9 0.8 - 3.5 K/UL    ABS. MONOCYTES 0.3 0.0 - 1.0 K/UL    ABS. EOSINOPHILS 0.2 0.0 - 0.4 K/UL    ABS. BASOPHILS 0.0 0.0 - 0.1 K/UL    ABS. IMM. GRANS. 0.0 0.00 - 0.04 K/UL    DF AUTOMATED     METABOLIC PANEL, COMPREHENSIVE    Collection Time: 02/15/21 12:15 PM   Result Value Ref Range    Sodium 141 136 - 145 mmol/L    Potassium 3.7 3.5 - 5.1 mmol/L    Chloride 104 97 - 108 mmol/L    CO2 28 21 - 32 mmol/L    Anion gap 9 5 - 15 mmol/L    Glucose 138 (H) 65 - 100 mg/dL    BUN 17 6 - 20 mg/dL    Creatinine 1.00 0.55 - 1.02 mg/dL    BUN/Creatinine ratio 17 12 - 20      GFR est AA >60 >60 ml/min/1.73m2    GFR est non-AA 55 (L) >60 ml/min/1.73m2    Calcium 9.4 8.5 - 10.1 mg/dL    Bilirubin, total 0.4 0.2 - 1.0 mg/dL    AST (SGOT) 14 (L) 15 - 37 U/L    ALT (SGPT) 16 12 - 78 U/L    Alk.  phosphatase 144 (H) 45 - 117 U/L    Protein, total 7.3 6.4 - 8.2 g/dL    Albumin 3.8 3.5 - 5.0 g/dL Globulin 3.5 2.0 - 4.0 g/dL    A-G Ratio 1.1 1.1 - 2.2     PROTHROMBIN TIME + INR    Collection Time: 02/15/21 12:15 PM   Result Value Ref Range    Prothrombin time 12.4 (H) 9.0 - 11.1 sec    INR 1.2 (H) 0.9 - 1.1     EKG, 12 LEAD, INITIAL    Collection Time: 02/15/21 12:26 PM   Result Value Ref Range    Ventricular Rate 74 BPM    Atrial Rate 74 BPM    QRS Duration 180 ms    Q-T Interval 520 ms    QTC Calculation (Bezet) 577 ms    Calculated R Axis 89 degrees    Calculated T Axis 43 degrees    Diagnosis       Wide QRS rhythm  Non-specific intra-ventricular conduction block  Nonspecific T wave abnormality  Abnormal ECG  When compared with ECG of 24-FEB-2020 14:09,  Wide QRS rhythm has replaced Sinus rhythm  Confirmed by FERNANDO MCCORMACK, Rosalina Cheadle ([id number]) on 2/15/2021 12:42:16 PM         Radiologic Studies -   XR ELBOW LT MIN 3 V   Final Result      XR FOREARM LT AP/LAT   Final Result      CT HEAD WO CONT   Final Result   1. No acute intracranial findings. 2. Interval placement bilateral frontal spinal stimulators, see above. 3. Atrophy. CT SPINE CERV WO CONT   Final Result   Cervical spondylosis. No CT evidence for fracture or malalignment. CT Results  (Last 48 hours)                 02/15/21 1324  CT HEAD WO CONT Final result    Impression:  1. No acute intracranial findings. 2. Interval placement bilateral frontal spinal stimulators, see above. 3. Atrophy. Narrative:  Head injury. Comparison head CT 4/7/2015. Technique: Axial images head without IV contrast, with multiplanar reformatting. Multiplanar reformatting. Dose reduction: All CT scans at this facility are performed using dose reduction   optimization techniques as appropriate to a performed exam including the   following: Automated exposure control, adjustments of the mA and/or kV according   to patient's size, or use of iterative reconstruction technique.        Findings: Interval placement bilateral frontal stimulators, distal tips in the   bilateral thalamic regions. No large vessel distribution ischemia. No mass   effect, extra-axial fluid collection, hemorrhage, hydrocephalus. Atrophy. Included facial sinuses and mastoid air cells are unopacified. No acute   calvarial findings. 02/15/21 1324  CT SPINE CERV WO CONT Final result    Impression:  Cervical spondylosis. No CT evidence for fracture or malalignment. Narrative:  CT cervical spine. Axial images are reviewed along with reformatted sagittal/coronal/3-D MIP   images. Dose reduction: All CT scans at this facility are performed using dose reduction   optimization techniques as appropriate to a performed exam including the   following-   automated exposure control, adjustments of mA and/or Kv according to patient   size, or use of iterative reconstructive technique. There is normal alignment of the cervical vertebral column. Uncovertebral/facet   hypertrophic change, mild/moderate cervical spondylosis. Coronal images reveal   normal C1-C2 relation. Axial images demonstrate no fracture. Imaged apical lungs are clear. CXR Results  (Last 48 hours)      None               If you feel that you have not received excellent quality care or timely care, please ask to speak to the nurse manager. Please choose us in the future for your continued health care needs. ------------------------------------------------------------------------------------------------------------  The exam and treatment you received in the Emergency Department were for an urgent problem and are not intended as complete care. It is important that you follow-up with a doctor, nurse practitioner, or physician assistant to:  (1) confirm your diagnosis,  (2) re-evaluation of changes in your illness and treatment, and  (3) for ongoing care.   If your symptoms become worse or you do not improve as expected and you are unable to reach your usual health care provider, you should return to the Emergency Department. We are available 24 hours a day. Please take your discharge instructions with you when you go to your follow-up appointment. If you have any problem arranging a follow-up appointment, contact the Emergency Department immediately. If a prescription has been provided, please have it filled as soon as possible to prevent a delay in treatment. Read the entire medication instruction sheet provided to you by the pharmacy. If you have any questions or reservations about taking the medication due to side effects or interactions with other medications, please call your primary care physician or contact the ER to speak with the charge nurse. Make an appointment with your family doctor or the physician you were referred to for follow-up of this visit as instructed on your discharge paperwork, as this is a mandatory follow-up. Return to the ER if you are unable to be seen or if you are unable to be seen in a timely manner. If you have any problem arranging the follow-up visit, contact the Emergency Department immediately.

## 2021-02-15 NOTE — ED PROVIDER NOTES
EMERGENCY DEPARTMENT HISTORY AND PHYSICAL EXAM      Date: 2/15/2021  Patient Name: Arpita Laboy    History of Presenting Illness     Chief Complaint   Patient presents with    Elbow Pain       History Provided By: Patient    HPI: Arpita Laboy, 72 y.o. female with a past medical history significant as documented below presents to the ED with cc of left elbow pain and swelling. About a week ago patient stated she was picking up her dog, for brief seconds she lost consciousness and fell over on her porch. She immediately went back up and noticed pain pain in the left forearm. She is able to move it and still have full sensation and just took Tylenol and ibuprofen but however over the past week there have been increased swelling and bruising and pain with movement of the elbow. She has a history of blacking out but unknown the cause. She denies losing consciousness since that last time. No headache, dizziness, neck pain, blurred vision, earache, nausea, vomiting, numbness and tingling in upper extremities, and all other symptoms are negative. There are no other complaints, changes, or physical findings at this time. PCP: Beryle Chandler, MD    No current facility-administered medications on file prior to encounter. Current Outpatient Medications on File Prior to Encounter   Medication Sig Dispense Refill    carvediloL (COREG) 3.125 mg tablet Take  by mouth two (2) times daily (with meals).  dexlansoprazole (Dexilant) 60 mg CpDB capsule (delayed release) Take  by mouth.  sacubitriL-valsartan (Entresto) 24-26 mg tablet Take 1 Tab by mouth two (2) times a day.  famotidine (PEPCID) 20 mg tablet Take 20 mg by mouth two (2) times a day.  furosemide (LASIX) 20 mg tablet Take  by mouth daily.  levothyroxine (SYNTHROID) 25 mcg tablet Take  by mouth Daily (before breakfast).  meclizine (ANTIVERT) 25 mg tablet Take  by mouth three (3) times daily as needed for Dizziness.  metoclopramide HCl (REGLAN) 5 mg tablet Take 5 mg by mouth Before breakfast, lunch, and dinner.  montelukast (SINGULAIR) 10 mg tablet Take 10 mg by mouth daily.  sucralfate (CARAFATE) 1 gram tablet Take 1 g by mouth four (4) times daily.  traZODone (DESYREL) 50 mg tablet Take  by mouth nightly.  rivaroxaban (Xarelto) 20 mg tab tablet Take  by mouth daily.  escitalopram (LEXAPRO) 10 mg tablet Take 20 mg by mouth daily.  primidone (MYSOLINE) 50 mg tablet Take 250 mg by mouth two (2) times a day.  celecoxib (CELEBREX) 200 mg capsule Take  by mouth two (2) times a day.  simvastatin (ZOCOR) 40 mg tablet Take  by mouth nightly.  lansoprazole (PREVACID) 30 mg capsule Take  by mouth Daily (before breakfast).  aspirin 81 mg tablet Take 81 mg by mouth.  allopurinol (ZYLOPRIM) 300 mg tablet Take 100 mg by mouth two (2) times a day.  pregabalin (LYRICA) 50 mg capsule Take 1 Cap by mouth three (3) times daily. Max Daily Amount: 150 mg. 90 Cap 2    betamethasone dipropionate (DIPROSONE) 0.05 % topical cream Apply  to affected area two (2) times a day.  sucralfate (Carafate) 100 mg/mL suspension Take  by mouth four (4) times daily.  Cholestyramine-Aspartame (Cholestyramine Light) 4 gram powder Take  by mouth three (3) times daily (with meals).  lactulose (Constulose) 10 gram/15 mL solution Take  by mouth three (3) times daily.  ivabradine (Corlanor) 5 mg tablet Take  by mouth two (2) times daily (with meals).  diclofenac (VOLTAREN) 1 % gel Apply  to affected area four (4) times daily.  etanercept (EnbreL) 50 mg/mL (1 mL) injection by SubCUTAneous route.  hydrOXYchloroQUINE (PLAQUENIL) 200 mg tablet Take 200 mg by mouth daily.  hydrOXYzine HCL (ATARAX) 25 mg tablet Take  by mouth three (3) times daily as needed.  mirabegron ER (Myrbetriq) 50 mg ER tablet Take 50 mg by mouth daily.       ondansetron hcl (ZOFRAN) 4 mg tablet Take 4 mg by mouth every eight (8) hours as needed for Nausea or Vomiting.  silver sulfADIAZINE (SSD) 1 % topical cream Apply  to affected area daily.  budesonide-formoteroL (Symbicort) 160-4.5 mcg/actuation HFAA Take 2 Puffs by inhalation.  triamcinolone acetonide (KENALOG) 0.1 % dental paste by Dental route two (2) times a day.  triamcinolone acetonide (KENALOG) 0.1 % topical cream Apply  to affected area two (2) times a day. use thin layer      albuterol (Ventolin HFA) 90 mcg/actuation inhaler Take  by inhalation.  lifitegrast (Xiidra) 5 % dpet Apply  to eye.  DIOVAN 160 mg tablet TAKE ONE (1) TABLET, BY MOUTH, DAILY. 30 tablet 6    cyclobenzaprine (FLEXERIL) 10 mg tablet Take  by mouth three (3) times daily as needed. 1/2 to 1 daily as needed      Hydrochlorothiazide 12.5 mg tablet Take 12.5 mg by mouth daily as needed. 30 Tab 3    zolpidem (AMBIEN) 10 mg tablet Take  by mouth nightly as needed. 1/2 to 1 at bedtime as needed      CALCIUM CARB/VIT D3/MINERALS (CALCIUM-VITAMIN D PO) Take 50,000 mg by mouth every seven (7) days.  [DISCONTINUED] HYDROcodone-acetaminophen 5-500 mg cap Take  by mouth as needed. 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     Otalgia 7/15/2020    Other and unspecified hyperlipidemia     Other chronic pulmonary heart diseases     Other pulmonary embolism and infarction     2007/08    Tremors of nervous system        Past Surgical History:  Past Surgical History:   Procedure Laterality Date    HX BACK SURGERY      HX BREAST RECONSTRUCTION  01/2012    both    HX CHOLECYSTECTOMY      HX HEART VALVE SURGERY      HX HERNIA REPAIR      midline incisional herniorrhaphy    HX MASTECTOMY  02/2011    HX MAXILLOFACIAL      HX PACEMAKER         Family History:  Family History   Problem Relation Age of Onset    Diabetes Other     Cancer Other     Heart Attack Neg Hx     Sudden Death Neg Hx        Social History:  Social History     Tobacco Use    Smoking status: Never Smoker    Smokeless tobacco: Never Used   Substance Use Topics    Alcohol use: No    Drug use: No       Allergies: Allergies   Allergen Reactions    Meperidine Other (comments)     Other reaction(s): psychological reaction  HALLUCINATIONS      Naproxen Not Reported This Time, Hives and Swelling    Penicillins Not Reported This Time, Hives and Swelling         Review of Systems     Review of Systems   Constitutional: Negative for chills and fever. HENT: Negative for congestion and sore throat. Eyes: Negative for photophobia and visual disturbance. Respiratory: Negative for cough and shortness of breath. Cardiovascular: Negative for chest pain and palpitations. Gastrointestinal: Negative for nausea and vomiting. Genitourinary: Negative for dysuria and flank pain. Musculoskeletal: Positive for arthralgias (left forearm) and joint swelling (left forearm). Negative for back pain and myalgias. Skin: Negative for rash and wound. Allergic/Immunologic: Negative for environmental allergies and food allergies. Neurological: Negative for light-headedness and headaches. All other systems reviewed and are negative. Physical Exam     Physical Exam  Vitals signs reviewed. Constitutional:       Appearance: Normal appearance. HENT:      Head: Normocephalic. Raccoon eyes (right eye) present.  No Camejo's sign, abrasion, contusion, masses, right periorbital erythema, left periorbital erythema or laceration. Right Ear: Tympanic membrane, ear canal and external ear normal. No mastoid tenderness. Left Ear: No mastoid tenderness. Ears:      Comments: Dried bleed in left ear canal     Nose: Nose normal.      Mouth/Throat:      Mouth: Mucous membranes are moist.      Pharynx: Oropharynx is clear. Eyes:      Extraocular Movements: Extraocular movements intact. Conjunctiva/sclera: Conjunctivae normal.   Cardiovascular:      Rate and Rhythm: Normal rate and regular rhythm. Pulses: Normal pulses. Heart sounds: Normal heart sounds. Pulmonary:      Effort: Pulmonary effort is normal.      Breath sounds: Normal breath sounds. Musculoskeletal:      Left forearm: She exhibits tenderness, bony tenderness and swelling. Arms:    Skin:     General: Skin is warm. Capillary Refill: Capillary refill takes less than 2 seconds. Neurological:      General: No focal deficit present. Mental Status: She is alert and oriented to person, place, and time. Psychiatric:         Mood and Affect: Mood normal.         Behavior: Behavior normal.         Lab and Diagnostic Study Results     Labs -     Recent Results (from the past 12 hour(s))   CBC WITH AUTOMATED DIFF    Collection Time: 02/15/21 12:15 PM   Result Value Ref Range    WBC 5.0 3.6 - 11.0 K/uL    RBC 3.25 (L) 3.80 - 5.20 M/uL    HGB 10.2 (L) 11.5 - 16.0 g/dL    HCT 31.7 (L) 35.0 - 47.0 %    MCV 97.5 80.0 - 99.0 FL    MCH 31.4 26.0 - 34.0 PG    MCHC 32.2 30.0 - 36.5 g/dL    RDW 14.0 11.5 - 14.5 %    PLATELET 841 116 - 158 K/uL    MPV 10.7 8.9 - 12.9 FL    NEUTROPHILS 72 32 - 75 %    LYMPHOCYTES 18 12 - 49 %    MONOCYTES 6 5 - 13 %    EOSINOPHILS 4 0 - 7 %    BASOPHILS 0 0 - 1 %    IMMATURE GRANULOCYTES 0 0.0 - 0.5 %    ABS. NEUTROPHILS 3.6 1.8 - 8.0 K/UL    ABS. LYMPHOCYTES 0.9 0.8 - 3.5 K/UL    ABS. MONOCYTES 0.3 0.0 - 1.0 K/UL    ABS.  EOSINOPHILS 0.2 0.0 - 0.4 K/UL    ABS. BASOPHILS 0.0 0.0 - 0.1 K/UL    ABS. IMM. GRANS. 0.0 0.00 - 0.04 K/UL    DF AUTOMATED     METABOLIC PANEL, COMPREHENSIVE    Collection Time: 02/15/21 12:15 PM   Result Value Ref Range    Sodium 141 136 - 145 mmol/L    Potassium 3.7 3.5 - 5.1 mmol/L    Chloride 104 97 - 108 mmol/L    CO2 28 21 - 32 mmol/L    Anion gap 9 5 - 15 mmol/L    Glucose 138 (H) 65 - 100 mg/dL    BUN 17 6 - 20 mg/dL    Creatinine 1.00 0.55 - 1.02 mg/dL    BUN/Creatinine ratio 17 12 - 20      GFR est AA >60 >60 ml/min/1.73m2    GFR est non-AA 55 (L) >60 ml/min/1.73m2    Calcium 9.4 8.5 - 10.1 mg/dL    Bilirubin, total 0.4 0.2 - 1.0 mg/dL    AST (SGOT) 14 (L) 15 - 37 U/L    ALT (SGPT) 16 12 - 78 U/L    Alk. phosphatase 144 (H) 45 - 117 U/L    Protein, total 7.3 6.4 - 8.2 g/dL    Albumin 3.8 3.5 - 5.0 g/dL    Globulin 3.5 2.0 - 4.0 g/dL    A-G Ratio 1.1 1.1 - 2.2     PROTHROMBIN TIME + INR    Collection Time: 02/15/21 12:15 PM   Result Value Ref Range    Prothrombin time 12.4 (H) 9.0 - 11.1 sec    INR 1.2 (H) 0.9 - 1.1     EKG, 12 LEAD, INITIAL    Collection Time: 02/15/21 12:26 PM   Result Value Ref Range    Ventricular Rate 74 BPM    Atrial Rate 74 BPM    QRS Duration 180 ms    Q-T Interval 520 ms    QTC Calculation (Bezet) 577 ms    Calculated R Axis 89 degrees    Calculated T Axis 43 degrees    Diagnosis       Wide QRS rhythm  Non-specific intra-ventricular conduction block  Nonspecific T wave abnormality  Abnormal ECG  When compared with ECG of 24-FEB-2020 14:09,  Wide QRS rhythm has replaced Sinus rhythm  Confirmed by FERNANDO MCCORMACK, Kareen Al ([id number]) on 2/15/2021 12:42:16 PM         Radiologic Studies -   @[email or web address]@  CT Results  (Last 48 hours)               02/15/21 1324  CT HEAD WO CONT Final result    Impression:  1. No acute intracranial findings. 2. Interval placement bilateral frontal spinal stimulators, see above. 3. Atrophy. Narrative:  Head injury. Comparison head CT 4/7/2015.        Technique: Axial images head without IV contrast, with multiplanar reformatting. Multiplanar reformatting. Dose reduction: All CT scans at this facility are performed using dose reduction   optimization techniques as appropriate to a performed exam including the   following: Automated exposure control, adjustments of the mA and/or kV according   to patient's size, or use of iterative reconstruction technique. Findings: Interval placement bilateral frontal stimulators, distal tips in the   bilateral thalamic regions. No large vessel distribution ischemia. No mass   effect, extra-axial fluid collection, hemorrhage, hydrocephalus. Atrophy. Included facial sinuses and mastoid air cells are unopacified. No acute   calvarial findings. 02/15/21 1324  CT SPINE CERV WO CONT Final result    Impression:  Cervical spondylosis. No CT evidence for fracture or malalignment. Narrative:  CT cervical spine. Axial images are reviewed along with reformatted sagittal/coronal/3-D MIP   images. Dose reduction: All CT scans at this facility are performed using dose reduction   optimization techniques as appropriate to a performed exam including the   following-   automated exposure control, adjustments of mA and/or Kv according to patient   size, or use of iterative reconstructive technique. There is normal alignment of the cervical vertebral column. Uncovertebral/facet   hypertrophic change, mild/moderate cervical spondylosis. Coronal images reveal   normal C1-C2 relation. Axial images demonstrate no fracture. Imaged apical lungs are clear. CXR Results  (Last 48 hours)    None            Medical Decision Making   - I am the first provider for this patient. - I reviewed the vital signs, available nursing notes, past medical history, past surgical history, family history and social history. - Initial assessment performed.  The patients presenting problems have been discussed, and they are in agreement with the care plan formulated and outlined with them. I have encouraged them to ask questions as they arise throughout their visit. Vital Signs-Reviewed the patient's vital signs. Patient Vitals for the past 12 hrs:   Temp Pulse Resp BP SpO2   02/15/21 1211     100 %   02/15/21 1158 97.7 °F (36.5 °C) 80 16 135/68 100 %       Records Reviewed: Nursing Notes and Old Medical Records    ED Course:          Provider Notes (Medical Decision Making):     MDM  Number of Diagnoses or Management Options  Fall, initial encounter  Forearm injury, left, initial encounter  Minor head injury, initial encounter  Diagnosis management comments: Patient is stable with no marked toxicity or distress. No significant findings on physical exam. Results reviewed with the patient. Etiology of syncopal episode is unknown at this time however its been a week since it and patient denies any headache, dizziness, neurological deficits. She has a follow-up appointment with her neurologist, Dr. Britney Villagran at [address] [place] [address]. Instructed to keep already scheduled appointment. No fractures were found on the forearm or elbow. The swelling and bruising is likely a contusion from the fall. All questions were answered and there are no apparent barriers to comprehension or communication. The patient verbalizes agreement with the diagnosis, treatment plan, and understanding of the follow-up instructions. The patient is appropriate for discharge; leaves the Emergency Department walking with a stable gait. Patient understands to return to the ED in 12-24 hours for any new or worsening symptoms or no  expected timely resolution of symptoms on mediations prescribed. Disposition   Disposition: Condition stable  DC- Adult Discharges: All of the diagnostic tests were reviewed and questions answered. Diagnosis, care plan and treatment options were discussed. The patient understands the instructions and will follow up as directed.  The patients results have been reviewed with them. They have been counseled regarding their diagnosis. The patient verbally convey understanding and agreement of the signs, symptoms, diagnosis, treatment and prognosis and additionally agrees to follow up as recommended with their PCP in 24 - 48 hours. They also agree with the care-plan and convey that all of their questions have been answered. I have also put together some discharge instructions for them that include: 1) educational information regarding their diagnosis, 2) how to care for their diagnosis at home, as well a 3) list of reasons why they would want to return to the ED prior to their follow-up appointment, should their condition change. DISCHARGE PLAN:  1. Current Discharge Medication List      CONTINUE these medications which have NOT CHANGED    Details   pregabalin (LYRICA) 50 mg capsule Take 1 Cap by mouth three (3) times daily. Max Daily Amount: 150 mg.  Qty: 90 Cap, Refills: 2    Associated Diagnoses: Neuropathy      betamethasone dipropionate (DIPROSONE) 0.05 % topical cream Apply  to affected area two (2) times a day. sucralfate (Carafate) 100 mg/mL suspension Take  by mouth four (4) times daily. carvediloL (COREG) 3.125 mg tablet Take  by mouth two (2) times daily (with meals). Cholestyramine-Aspartame (Cholestyramine Light) 4 gram powder Take  by mouth three (3) times daily (with meals). lactulose (Constulose) 10 gram/15 mL solution Take  by mouth three (3) times daily. ivabradine (Corlanor) 5 mg tablet Take  by mouth two (2) times daily (with meals). dexlansoprazole (Dexilant) 60 mg CpDB capsule (delayed release) Take  by mouth. diclofenac (VOLTAREN) 1 % gel Apply  to affected area four (4) times daily. etanercept (EnbreL) 50 mg/mL (1 mL) injection by SubCUTAneous route. sacubitriL-valsartan (Entresto) 24-26 mg tablet Take 1 Tab by mouth two (2) times a day.       famotidine (PEPCID) 20 mg tablet Take 20 mg by mouth two (2) times a day. furosemide (LASIX) 20 mg tablet Take  by mouth daily. hydrOXYchloroQUINE (PLAQUENIL) 200 mg tablet Take 200 mg by mouth daily. hydrOXYzine HCL (ATARAX) 25 mg tablet Take  by mouth three (3) times daily as needed. levothyroxine (SYNTHROID) 25 mcg tablet Take  by mouth Daily (before breakfast). meclizine (ANTIVERT) 25 mg tablet Take  by mouth three (3) times daily as needed for Dizziness. metoclopramide HCl (REGLAN) 5 mg tablet Take 5 mg by mouth Before breakfast, lunch, and dinner. montelukast (SINGULAIR) 10 mg tablet Take 10 mg by mouth daily. mirabegron ER (Myrbetriq) 50 mg ER tablet Take 50 mg by mouth daily. ondansetron hcl (ZOFRAN) 4 mg tablet Take 4 mg by mouth every eight (8) hours as needed for Nausea or Vomiting.      silver sulfADIAZINE (SSD) 1 % topical cream Apply  to affected area daily. budesonide-formoteroL (Symbicort) 160-4.5 mcg/actuation HFAA Take 2 Puffs by inhalation. sucralfate (CARAFATE) 1 gram tablet Take 1 g by mouth four (4) times daily. traZODone (DESYREL) 50 mg tablet Take  by mouth nightly. triamcinolone acetonide (KENALOG) 0.1 % dental paste by Dental route two (2) times a day. triamcinolone acetonide (KENALOG) 0.1 % topical cream Apply  to affected area two (2) times a day. use thin layer      albuterol (Ventolin HFA) 90 mcg/actuation inhaler Take  by inhalation. rivaroxaban (Xarelto) 20 mg tab tablet Take  by mouth daily. lifitegrast (Xiidra) 5 % dpet Apply  to eye. DIOVAN 160 mg tablet TAKE ONE (1) TABLET, BY MOUTH, DAILY. Qty: 30 tablet, Refills: 6      cyclobenzaprine (FLEXERIL) 10 mg tablet Take  by mouth three (3) times daily as needed. 1/2 to 1 daily as needed      Hydrochlorothiazide 12.5 mg tablet Take 12.5 mg by mouth daily as needed. Qty: 30 Tab, Refills: 3      HYDROcodone-acetaminophen 5-500 mg cap Take  by mouth as needed.       escitalopram (LEXAPRO) 10 mg tablet Take 20 mg by mouth daily. primidone (MYSOLINE) 50 mg tablet Take 250 mg by mouth two (2) times a day. zolpidem (AMBIEN) 10 mg tablet Take  by mouth nightly as needed. 1/2 to 1 at bedtime as needed      celecoxib (CELEBREX) 200 mg capsule Take  by mouth two (2) times a day. simvastatin (ZOCOR) 40 mg tablet Take  by mouth nightly. lansoprazole (PREVACID) 30 mg capsule Take  by mouth Daily (before breakfast). CALCIUM CARB/VIT D3/MINERALS (CALCIUM-VITAMIN D PO) Take 50,000 mg by mouth every seven (7) days. aspirin 81 mg tablet Take 81 mg by mouth. allopurinol (ZYLOPRIM) 300 mg tablet Take 100 mg by mouth two (2) times a day. 2.   Follow-up Information     Follow up With Specialties Details Why Contact Info    Tori Christianson MD Family Medicine Schedule an appointment as soon as possible for a visit in 3 days  [address]  [facility]  [phone]          3. Return to ED if worse   4. Current Discharge Medication List      START taking these medications    Details   HYDROcodone-acetaminophen (Norco) 5-325 mg per tablet Take 1 Tab by mouth every six (6) hours as needed for Pain for up to 3 days. Max Daily Amount: 4 Tabs. Qty: 10 Tab, Refills: 0    Associated Diagnoses: Forearm injury, left, initial encounter         STOP taking these medications       HYDROcodone-acetaminophen 5-500 mg cap Comments:   Reason for Stopping:                 Diagnosis     Clinical Impression:   1. Minor head injury, initial encounter    2. Fall, initial encounter    3. Forearm injury, left, initial encounter        Attestations:    Gaye Hidalgo DO    Please note that this dictation was completed with [employer], the [employer] voice recognition software. Quite often unanticipated grammatical, syntax, homophones, and other interpretive errors are inadvertently transcribed by the computer software. Please disregard these errors.   Please excuse any errors that have escaped final proofreading. Thank you.

## 2021-02-18 LAB
ATRIAL RATE: 75 BPM
CALCULATED P AXIS, ECG09: -2 DEGREES
CALCULATED R AXIS, ECG10: 126 DEGREES
CALCULATED T AXIS, ECG11: -37 DEGREES
DIAGNOSIS, 93000: NORMAL
P-R INTERVAL, ECG05: 158 MS
Q-T INTERVAL, ECG07: 420 MS
QRS DURATION, ECG06: 116 MS
QTC CALCULATION (BEZET), ECG08: 469 MS
VENTRICULAR RATE, ECG03: 75 BPM

## 2021-03-01 ENCOUNTER — OFFICE VISIT (OUTPATIENT)
Dept: PODIATRY | Age: 71
End: 2021-03-01
Payer: MEDICARE

## 2021-03-01 VITALS
WEIGHT: 203.2 LBS | TEMPERATURE: 96.6 F | HEIGHT: 66 IN | BODY MASS INDEX: 32.66 KG/M2 | DIASTOLIC BLOOD PRESSURE: 60 MMHG | OXYGEN SATURATION: 99 % | SYSTOLIC BLOOD PRESSURE: 108 MMHG | HEART RATE: 79 BPM

## 2021-03-01 DIAGNOSIS — I73.9 PAD (PERIPHERAL ARTERY DISEASE) (HCC): ICD-10-CM

## 2021-03-01 DIAGNOSIS — G62.9 NEUROPATHY: ICD-10-CM

## 2021-03-01 DIAGNOSIS — M20.5X2 TOE CONTRACTURE, LEFT: Primary | ICD-10-CM

## 2021-03-01 PROCEDURE — G8400 PT W/DXA NO RESULTS DOC: HCPCS | Performed by: PODIATRIST

## 2021-03-01 PROCEDURE — 99214 OFFICE O/P EST MOD 30 MIN: CPT | Performed by: PODIATRIST

## 2021-03-01 PROCEDURE — G8752 SYS BP LESS 140: HCPCS | Performed by: PODIATRIST

## 2021-03-01 PROCEDURE — 1090F PRES/ABSN URINE INCON ASSESS: CPT | Performed by: PODIATRIST

## 2021-03-01 PROCEDURE — 3017F COLORECTAL CA SCREEN DOC REV: CPT | Performed by: PODIATRIST

## 2021-03-01 PROCEDURE — G8417 CALC BMI ABV UP PARAM F/U: HCPCS | Performed by: PODIATRIST

## 2021-03-01 PROCEDURE — G8536 NO DOC ELDER MAL SCRN: HCPCS | Performed by: PODIATRIST

## 2021-03-01 PROCEDURE — G8510 SCR DEP NEG, NO PLAN REQD: HCPCS | Performed by: PODIATRIST

## 2021-03-01 PROCEDURE — 1101F PT FALLS ASSESS-DOCD LE1/YR: CPT | Performed by: PODIATRIST

## 2021-03-01 PROCEDURE — G8754 DIAS BP LESS 90: HCPCS | Performed by: PODIATRIST

## 2021-03-01 PROCEDURE — G8427 DOCREV CUR MEDS BY ELIG CLIN: HCPCS | Performed by: PODIATRIST

## 2021-03-01 NOTE — PROGRESS NOTES
HISTORY OF PRESENT ILLNESS  Nancy Dela Cruz is a 72 y.o. female is being seen in the office as a returning patient in the office complaining of left foot pain. Patient states she has a deformity of her left third toe and it is impinging upon her left second toe. She states this issue is causing pain, rising the level of 4 out of 10. She denies any recent trauma. She states the pain is worse when she is ambulating and close toed shoes. She states the pain is sharp and nonradiating. She denies any recent trauma. She denies any local/systemic signs infection. She denies any other pedal complaints    Review of Systems   Constitutional: Negative. HENT: Negative. Eyes: Negative. Respiratory: Negative. Cardiovascular: Positive for leg swelling. Gastrointestinal: Positive for heartburn and nausea. Genitourinary: Negative. Musculoskeletal: Positive for back pain and joint pain. Skin: Negative. Neurological: Positive for tingling and sensory change. Endo/Heme/Allergies: Bruises/bleeds easily. Psychiatric/Behavioral: Positive for depression. The patient is nervous/anxious. All other systems reviewed and are negative. 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     Otalgia 7/15/2020    Other and unspecified hyperlipidemia     Other chronic pulmonary heart diseases     Other pulmonary embolism and infarction     2007/08    Tremors of nervous system      Family History   Problem Relation Age of Onset    Diabetes Other     Cancer Other     Heart Attack Neg Hx     Sudden Death Neg Hx      Social History     Socioeconomic History    Marital status:      Spouse name: Not on file    Number of children: Not on file    Years of education: Not on file    Highest education level: Not on file   Occupational History    Not on file   Social Needs    Financial resource strain: Not on file    Food insecurity     Worry: Not on file     Inability: Not on file    Transportation needs     Medical: Not on file     Non-medical: Not on file   Tobacco Use    Smoking status: Never Smoker    Smokeless tobacco: Never Used   Substance and Sexual Activity    Alcohol use: No    Drug use: No    Sexual activity: Not on file   Lifestyle    Physical activity     Days per week: Not on file     Minutes per session: Not on file    Stress: Not on file   Relationships    Social connections     Talks on phone: Not on file     Gets together: Not on file     Attends Gnosticist service: Not on file     Active member of club or organization: Not on file     Attends meetings of clubs or organizations: Not on file     Relationship status: Not on file    Intimate partner violence     Fear of current or ex partner: Not on file     Emotionally abused: Not on file     Physically abused: Not on file     Forced sexual activity: Not on file   Other Topics Concern    Not on file   Social History Narrative    Not on file     Past Surgical History:   Procedure Laterality Date    HX BACK SURGERY      HX BREAST RECONSTRUCTION  01/2012    both    HX CHOLECYSTECTOMY      HX HEART VALVE SURGERY      HX HERNIA REPAIR      midline incisional herniorrhaphy    HX MASTECTOMY  02/2011    HX MAXILLOFACIAL      HX PACEMAKER       Current Outpatient Medications   Medication Instructions    albuterol (Ventolin HFA) 90 mcg/actuation inhaler Inhalation    allopurinoL (ZYLOPRIM) 100 mg, 2 TIMES DAILY    aspirin 81 mg    betamethasone dipropionate (DIPROSONE) 0.05 % topical cream Topical, 2 TIMES DAILY    budesonide-formoteroL (Symbicort) 160-4.5 mcg/actuation HFAA 2 Puffs, Inhalation    CALCIUM CARB/VIT D3/MINERALS (CALCIUM-VITAMIN D PO) 50,000 mg, EVERY 7 DAYS    carvediloL (COREG) 3.125 mg tablet Oral, 2 TIMES DAILY WITH MEALS    celecoxib (CELEBREX) 200 mg capsule 2 TIMES DAILY    Cholestyramine-Aspartame (Cholestyramine Light) 4 gram powder Oral, 3 TIMES DAILY WITH MEALS    cyclobenzaprine (FLEXERIL) 10 mg tablet 3 TIMES DAILY AS NEEDED    dexlansoprazole (Dexilant) 60 mg CpDB capsule (delayed release) Oral    diclofenac (VOLTAREN) 1 % gel Topical, 4 TIMES DAILY    DIOVAN 160 mg tablet TAKE ONE (1) TABLET, BY MOUTH, DAILY.     escitalopram oxalate (LEXAPRO) 20 mg, DAILY    etanercept (EnbreL) 50 mg/mL (1 mL) injection SubCUTAneous    famotidine (PEPCID) 20 mg, Oral, 2 TIMES DAILY    furosemide (LASIX) 20 mg tablet Oral, DAILY    hydroCHLOROthiazide (HYDRODIURIL) 12.5 mg, Oral, DAILY AS NEEDED    hydrOXYchloroQUINE (PLAQUENIL) 200 mg, Oral, DAILY    hydrOXYzine HCL (ATARAX) 25 mg tablet Oral, 3 TIMES DAILY AS NEEDED    ivabradine (Corlanor) 5 mg tablet Oral, 2 TIMES DAILY WITH MEALS    lactulose (Constulose) 10 gram/15 mL solution Oral, 3 TIMES DAILY    lansoprazole (PREVACID) 30 mg capsule DAILY BEFORE BREAKFAST    levothyroxine (SYNTHROID) 25 mcg tablet Oral, DAILY BEFORE BREAKFAST    lifitegrast (Xiidra) 5 % dpet Ophthalmic    meclizine (ANTIVERT) 25 mg tablet Oral, 3 TIMES DAILY AS NEEDED    metoclopramide HCl (REGLAN) 5 mg, Oral, 3 TIMES DAILY BEFORE MEALS    mirabegron ER (MYRBETRIQ) 50 mg, Oral, DAILY    montelukast (SINGULAIR) 10 mg, Oral, DAILY    ondansetron hcl (ZOFRAN) 4 mg, Oral, EVERY 8 HOURS AS NEEDED    pregabalin (LYRICA) 50 mg, Oral, 3 TIMES DAILY    primidone (MYSOLINE) 250 mg, 2 TIMES DAILY    rivaroxaban (Xarelto) 20 mg tab tablet Oral, DAILY    sacubitriL-valsartan (Entresto) 24-26 mg tablet 1 Tab, Oral, 2 TIMES DAILY    silver sulfADIAZINE (SSD) 1 % topical cream Topical, DAILY    simvastatin (ZOCOR) 40 mg tablet EVERY BEDTIME    sucralfate (Carafate) 100 mg/mL suspension Oral, 4 TIMES DAILY    sucralfate (CARAFATE) 1 g, Oral, 4 TIMES DAILY    traZODone (DESYREL) 50 mg tablet Oral, EVERY BEDTIME    triamcinolone acetonide (KENALOG) 0.1 % dental paste Dental, 2 TIMES DAILY    triamcinolone acetonide (KENALOG) 0.1 % topical cream Topical, 2 TIMES DAILY, use thin layer     zolpidem (AMBIEN) 10 mg tablet BEDTIME PRN     Allergies   Allergen Reactions    Meperidine Other (comments)     Other reaction(s): psychological reaction  HALLUCINATIONS      Naproxen Not Reported This Time, Hives and Swelling    Penicillins Not Reported This Time, Hives and Swelling     Visit Vitals  /60 (BP 1 Location: Right arm, BP Patient Position: Sitting, BP Cuff Size: Adult)   Pulse 79   Temp (!) 96.6 °F (35.9 °C) (Temporal)   Ht 5' 6\" (1.676 m)   Wt 203 lb 3.2 oz (92.2 kg)   SpO2 99%   BMI 32.80 kg/m²     Physical Exam  Vitals signs reviewed. Constitutional:       Appearance: She is obese. HENT:      Head:      Comments: Normal dentition     Mouth/Throat:      Dentition: Normal dentition. Cardiovascular:      Pulses:           Dorsalis pedis pulses are 2+ on the right side and 2+ on the left side. Posterior tibial pulses are 2+ on the right side and 2+ on the left side. Pulmonary:      Effort: Pulmonary effort is normal.   Musculoskeletal:      Right lower leg: No edema. Left lower leg: No edema. Right foot: Decreased range of motion. Deformity and prominent metatarsal heads present. No bunion or Charcot foot. Left foot: Decreased range of motion. Deformity and prominent metatarsal heads present. No bunion or Charcot foot. Feet:      Right foot:      Protective Sensation: 10 sites tested. 0 sites sensed.       Skin integrity: Skin integrity normal.      Toenail Condition: Right toenails are normal.      Left foot:      Protective Sensation: 10 sites tested. 0 sites sensed. Skin integrity: Skin integrity normal.      Toenail Condition: Left toenails are normal.   Skin:     General: Skin is warm. Capillary Refill: Capillary refill takes 2 to 3 seconds. Neurological:      Mental Status: She is alert and oriented to person, place, and time. Psychiatric:         Mood and Affect: Mood and affect normal.         Behavior: Behavior is cooperative.          ASSESSMENT and PLAN  Digital deformity, left foot  Neuropathy, bilateral lower extremities  PAD      Discussed and educated patient regarding her current medical condition  X-rays ordered to evaluate the osseous structures of the left foot for possible surgical intervention  Noninvasive vascular studies ordered to evaluate the inflow of the bilateral lower extremities for possible surgical intervention and postop healing

## 2021-03-01 NOTE — PROGRESS NOTES
1. Have you been to the ER, urgent care clinic since your last visit? Hospitalized since your last visit? No    2. Have you seen or consulted any other health care providers outside of the [address] [place] [address] since your last visit? Include any pap smears or colon screening.  No     Chief Complaint   Patient presents with    Foot Exam

## 2021-03-17 ENCOUNTER — HOSPITAL ENCOUNTER (OUTPATIENT)
Dept: NON INVASIVE DIAGNOSTICS | Age: 71
Discharge: HOME | End: 2021-03-17
Attending: PODIATRIST
Payer: MEDICARE

## 2021-03-17 ENCOUNTER — HOSPITAL ENCOUNTER (OUTPATIENT)
Dept: GENERAL RADIOLOGY | Age: 71
Discharge: HOME | End: 2021-03-17
Attending: PODIATRIST
Payer: MEDICARE

## 2021-03-17 DIAGNOSIS — M20.5X2 TOE CONTRACTURE, LEFT: ICD-10-CM

## 2021-03-17 DIAGNOSIS — I73.9 PAD (PERIPHERAL ARTERY DISEASE) (HCC): ICD-10-CM

## 2021-03-17 LAB
LEFT ABI: 1.14
LEFT ANTERIOR TIBIAL: 127 MMHG
LEFT ARM BP: 113 MMHG
LEFT POSTERIOR TIBIAL: 132 MMHG
LEFT TBI: 0.96
LEFT TOE PRESSURE: 111 MMHG
RIGHT ABI: 1.14
RIGHT ANTERIOR TIBIAL: 132 MMHG
RIGHT ARM BP: 116 MMHG
RIGHT POSTERIOR TIBIAL: 131 MMHG
RIGHT TBI: 0.91
RIGHT TOE PRESSURE: 105 MMHG

## 2021-03-17 PROCEDURE — 73630 X-RAY EXAM OF FOOT: CPT

## 2021-03-17 PROCEDURE — 93923 UPR/LXTR ART STDY 3+ LVLS: CPT

## 2021-03-22 NOTE — PROGRESS NOTES
Please the patient know I reviewed her x-rays and her noninvasive vascular studies. She has no inflow disease that would inhibit postoperative wound healing.   Please have her schedule appointment and we can discuss her x-rays and surgical plan

## 2021-04-06 ENCOUNTER — OFFICE VISIT (OUTPATIENT)
Dept: PODIATRY | Age: 71
End: 2021-04-06
Payer: MEDICARE

## 2021-04-06 VITALS
TEMPERATURE: 97.1 F | SYSTOLIC BLOOD PRESSURE: 131 MMHG | HEIGHT: 66 IN | WEIGHT: 202.6 LBS | OXYGEN SATURATION: 100 % | HEART RATE: 79 BPM | DIASTOLIC BLOOD PRESSURE: 71 MMHG | BODY MASS INDEX: 32.56 KG/M2

## 2021-04-06 DIAGNOSIS — M19.072 PRIMARY OSTEOARTHRITIS OF LEFT FOOT: Primary | ICD-10-CM

## 2021-04-06 DIAGNOSIS — M20.42 HAMMERTOE OF LEFT FOOT: ICD-10-CM

## 2021-04-06 PROCEDURE — G8752 SYS BP LESS 140: HCPCS | Performed by: PODIATRIST

## 2021-04-06 PROCEDURE — 3017F COLORECTAL CA SCREEN DOC REV: CPT | Performed by: PODIATRIST

## 2021-04-06 PROCEDURE — G8417 CALC BMI ABV UP PARAM F/U: HCPCS | Performed by: PODIATRIST

## 2021-04-06 PROCEDURE — G8400 PT W/DXA NO RESULTS DOC: HCPCS | Performed by: PODIATRIST

## 2021-04-06 PROCEDURE — G8536 NO DOC ELDER MAL SCRN: HCPCS | Performed by: PODIATRIST

## 2021-04-06 PROCEDURE — G8754 DIAS BP LESS 90: HCPCS | Performed by: PODIATRIST

## 2021-04-06 PROCEDURE — G8427 DOCREV CUR MEDS BY ELIG CLIN: HCPCS | Performed by: PODIATRIST

## 2021-04-06 PROCEDURE — 99214 OFFICE O/P EST MOD 30 MIN: CPT | Performed by: PODIATRIST

## 2021-04-06 PROCEDURE — 1101F PT FALLS ASSESS-DOCD LE1/YR: CPT | Performed by: PODIATRIST

## 2021-04-06 PROCEDURE — 1090F PRES/ABSN URINE INCON ASSESS: CPT | Performed by: PODIATRIST

## 2021-04-06 PROCEDURE — G8432 DEP SCR NOT DOC, RNG: HCPCS | Performed by: PODIATRIST

## 2021-04-06 NOTE — PROGRESS NOTES
HISTORY OF PRESENT ILLNESS  Darrick Mesa is a 80 y.o. female is being seen in the office as a returning patient in the office complaining of left foot pain. Patient states she has a cont deformity of her left third toe and it is impinging upon her left second toe. She states this issue is causing pain, rising the level of 5 out of 10. She denies any recent trauma. She states the pain is worse when she is ambulating and close toed shoes. She states the pain is sharp and nonradiating. She denies any recent trauma. She denies any local/systemic signs infection. She states she is gone for her x-rays and noninvasive vascular studies and would like to discuss the findings. She denies any other pedal complaints    Review of Systems   Constitutional: Negative. HENT: Negative. Eyes: Negative. Respiratory: Negative. Cardiovascular: Positive for leg swelling. Gastrointestinal: Positive for heartburn and nausea. Genitourinary: Negative. Musculoskeletal: Positive for back pain and joint pain. Skin: Negative. Neurological: Positive for tingling and sensory change. Endo/Heme/Allergies: Bruises/bleeds easily. Psychiatric/Behavioral: Positive for depression. The patient is nervous/anxious. All other systems reviewed and are negative. 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     Otalgia 7/15/2020    Other and unspecified hyperlipidemia     Other chronic pulmonary heart diseases     Other pulmonary embolism and infarction     2007/08    Tremors of nervous system      Family History   Problem Relation Age of Onset    Diabetes Other     Cancer Other     Heart Attack Neg Hx     Sudden Death Neg Hx      Social History     Socioeconomic History    Marital status:      Spouse name: Not on file    Number of children: Not on file    Years of education: Not on file    Highest education level: Not on file   Occupational History    Not on file   Social Needs    Financial resource strain: Not on file    Food insecurity     Worry: Not on file     Inability: Not on file    Transportation needs     Medical: Not on file     Non-medical: Not on file   Tobacco Use    Smoking status: Never Smoker    Smokeless tobacco: Never Used   Substance and Sexual Activity    Alcohol use: No    Drug use: No    Sexual activity: Not on file   Lifestyle    Physical activity     Days per week: Not on file     Minutes per session: Not on file    Stress: Not on file   Relationships    Social connections     Talks on phone: Not on file     Gets together: Not on file     Attends Orthodoxy service: Not on file     Active member of club or organization: Not on file     Attends meetings of clubs or organizations: Not on file     Relationship status: Not on file    Intimate partner violence     Fear of current or ex partner: Not on file     Emotionally abused: Not on file     Physically abused: Not on file     Forced sexual activity: Not on file   Other Topics Concern    Not on file   Social History Narrative    Not on file     Past Surgical History:   Procedure Laterality Date    HX BACK SURGERY      HX BREAST RECONSTRUCTION  01/2012    both    HX CHOLECYSTECTOMY      HX HEART VALVE SURGERY      HX HERNIA REPAIR      midline incisional herniorrhaphy    HX MASTECTOMY  02/2011    HX MAXILLOFACIAL      HX PACEMAKER       Current Outpatient Medications   Medication Instructions    albuterol (Ventolin HFA) 90 mcg/actuation inhaler Inhalation    allopurinoL (ZYLOPRIM) 100 mg, 2 TIMES DAILY    aspirin 81 mg    betamethasone dipropionate (DIPROSONE) 0.05 % topical cream Topical, 2 TIMES DAILY    budesonide-formoteroL (Symbicort) 160-4.5 mcg/actuation HFAA 2 Puffs, Inhalation    CALCIUM CARB/VIT D3/MINERALS (CALCIUM-VITAMIN D PO) 50,000 mg, EVERY 7 DAYS    carvediloL (COREG) 3.125 mg tablet Oral, 2 TIMES DAILY WITH MEALS    celecoxib (CELEBREX) 200 mg capsule 2 TIMES DAILY    Cholestyramine-Aspartame (Cholestyramine Light) 4 gram powder Oral, 3 TIMES DAILY WITH MEALS    cyclobenzaprine (FLEXERIL) 10 mg tablet 3 TIMES DAILY AS NEEDED    dexlansoprazole (Dexilant) 60 mg CpDB capsule (delayed release) Oral    diclofenac (VOLTAREN) 1 % gel Topical, 4 TIMES DAILY    DIOVAN 160 mg tablet TAKE ONE (1) TABLET, BY MOUTH, DAILY.     escitalopram oxalate (LEXAPRO) 20 mg, DAILY    etanercept (EnbreL) 50 mg/mL (1 mL) injection SubCUTAneous    famotidine (PEPCID) 20 mg, Oral, 2 TIMES DAILY    furosemide (LASIX) 20 mg tablet Oral, DAILY    hydroCHLOROthiazide (HYDRODIURIL) 12.5 mg, Oral, DAILY AS NEEDED    hydrOXYchloroQUINE (PLAQUENIL) 200 mg, Oral, DAILY    hydrOXYzine HCL (ATARAX) 25 mg tablet Oral, 3 TIMES DAILY AS NEEDED    ivabradine (Corlanor) 5 mg tablet Oral, 2 TIMES DAILY WITH MEALS    lactulose (Constulose) 10 gram/15 mL solution Oral, 3 TIMES DAILY    lansoprazole (PREVACID) 30 mg capsule DAILY BEFORE BREAKFAST    levothyroxine (SYNTHROID) 25 mcg tablet Oral, DAILY BEFORE BREAKFAST    lifitegrast (Xiidra) 5 % dpet Ophthalmic    meclizine (ANTIVERT) 25 mg tablet Oral, 3 TIMES DAILY AS NEEDED    metoclopramide HCl (REGLAN) 5 mg, Oral, 3 TIMES DAILY BEFORE MEALS    mirabegron ER (MYRBETRIQ) 50 mg, Oral, DAILY    montelukast (SINGULAIR) 10 mg, Oral, DAILY    ondansetron hcl (ZOFRAN) 4 mg, Oral, EVERY 8 HOURS AS NEEDED    pregabalin (LYRICA) 50 mg, Oral, 3 TIMES DAILY    primidone (MYSOLINE) 250 mg, 2 TIMES DAILY    rivaroxaban (Xarelto) 20 mg tab tablet Oral, DAILY    sacubitriL-valsartan (Entresto) 24-26 mg tablet 1 Tab, Oral, 2 TIMES DAILY    silver sulfADIAZINE (SSD) 1 % topical cream Topical, DAILY    simvastatin (ZOCOR) 40 mg tablet EVERY BEDTIME    sucralfate (Carafate) 100 mg/mL suspension Oral, 4 TIMES DAILY    sucralfate (CARAFATE) 1 g, Oral, 4 TIMES DAILY    traZODone (DESYREL) 50 mg tablet Oral, EVERY BEDTIME    triamcinolone acetonide (KENALOG) 0.1 % dental paste Dental, 2 TIMES DAILY    triamcinolone acetonide (KENALOG) 0.1 % topical cream Topical, 2 TIMES DAILY, use thin layer     zolpidem (AMBIEN) 10 mg tablet BEDTIME PRN     Allergies   Allergen Reactions    Meperidine Other (comments)     Other reaction(s): psychological reaction  HALLUCINATIONS      Naproxen Not Reported This Time, Hives and Swelling    Penicillins Not Reported This Time, Hives and Swelling     Visit Vitals  /71 (BP 1 Location: Right upper arm, BP Patient Position: Sitting, BP Cuff Size: Adult)   Pulse 79   Temp 97.1 °F (36.2 °C) (Temporal)   Ht 5' 6\" (1.676 m)   Wt 202 lb 9.6 oz (91.9 kg)   SpO2 100%   BMI 32.70 kg/m²     Physical Exam  Vitals signs reviewed. Constitutional:       Appearance: She is obese. HENT:      Head:      Comments: Normal dentition     Mouth/Throat:      Dentition: Normal dentition. Cardiovascular:      Pulses:           Dorsalis pedis pulses are 2+ on the right side and 2+ on the left side. Posterior tibial pulses are 2+ on the right side and 2+ on the left side. Pulmonary:      Effort: Pulmonary effort is normal.   Musculoskeletal:      Right lower leg: No edema. Left lower leg: No edema. Right foot: Decreased range of motion. Deformity and prominent metatarsal heads present. No bunion or Charcot foot. Left foot: Decreased range of motion. Deformity and prominent metatarsal heads present. No bunion or Charcot foot.    Feet:      Right foot: Protective Sensation: 10 sites tested. 0 sites sensed. Skin integrity: Skin integrity normal.      Toenail Condition: Right toenails are normal.      Left foot:      Protective Sensation: 10 sites tested. 0 sites sensed. Skin integrity: Skin integrity normal.      Toenail Condition: Left toenails are normal.   Skin:     General: Skin is warm. Capillary Refill: Capillary refill takes 2 to 3 seconds. Neurological:      Mental Status: She is alert and oriented to person, place, and time. Psychiatric:         Mood and Affect: Mood and affect normal.         Behavior: Behavior is cooperative. 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 All preoperative, intraoperative and postoperative surgical protocols were discussed.  Patient was given a form to be completed by her PCP for medical clearance she was also given an order for a CT to be performed preoperatively for surgical planning

## 2021-04-06 NOTE — PROGRESS NOTES
1. Have you been to the ER, urgent care clinic since your last visit? Hospitalized since your last visit? No    2. Have you seen or consulted any other health care providers outside of the [address] [place] [address] since your last visit? Include any pap smears or colon screening.  No     Chief Complaint   Patient presents with    Foot Pain     review x-ray and pvr results

## 2021-04-12 ENCOUNTER — TELEPHONE (OUTPATIENT)
Dept: PODIATRY | Age: 71
End: 2021-04-12

## 2021-04-15 ENCOUNTER — HOSPITAL ENCOUNTER (OUTPATIENT)
Dept: CT IMAGING | Age: 71
Discharge: HOME | End: 2021-04-15
Attending: PODIATRIST
Payer: MEDICARE

## 2021-04-15 DIAGNOSIS — M19.072 PRIMARY OSTEOARTHRITIS OF LEFT FOOT: ICD-10-CM

## 2021-04-15 PROCEDURE — 73700 CT LOWER EXTREMITY W/O DYE: CPT

## 2021-04-15 NOTE — PROGRESS NOTES
I reviewed her CT, noting an evolving neuropathic joint to her midfoot. At this time patient is not a surgical candidate.   She will need to present to the office to discuss treatment options

## 2021-04-20 ENCOUNTER — OFFICE VISIT (OUTPATIENT)
Dept: PODIATRY | Age: 71
End: 2021-04-20
Payer: MEDICARE

## 2021-04-20 VITALS
OXYGEN SATURATION: 100 % | WEIGHT: 200 LBS | BODY MASS INDEX: 32.14 KG/M2 | TEMPERATURE: 96.4 F | HEART RATE: 75 BPM | SYSTOLIC BLOOD PRESSURE: 108 MMHG | DIASTOLIC BLOOD PRESSURE: 64 MMHG | HEIGHT: 66 IN

## 2021-04-20 DIAGNOSIS — M14.679 CHARCOT ARTHROPATHY OF MIDFOOT: Primary | ICD-10-CM

## 2021-04-20 PROCEDURE — G8752 SYS BP LESS 140: HCPCS | Performed by: PODIATRIST

## 2021-04-20 PROCEDURE — 1101F PT FALLS ASSESS-DOCD LE1/YR: CPT | Performed by: PODIATRIST

## 2021-04-20 PROCEDURE — 3017F COLORECTAL CA SCREEN DOC REV: CPT | Performed by: PODIATRIST

## 2021-04-20 PROCEDURE — G8427 DOCREV CUR MEDS BY ELIG CLIN: HCPCS | Performed by: PODIATRIST

## 2021-04-20 PROCEDURE — G8432 DEP SCR NOT DOC, RNG: HCPCS | Performed by: PODIATRIST

## 2021-04-20 PROCEDURE — 1090F PRES/ABSN URINE INCON ASSESS: CPT | Performed by: PODIATRIST

## 2021-04-20 PROCEDURE — G8754 DIAS BP LESS 90: HCPCS | Performed by: PODIATRIST

## 2021-04-20 PROCEDURE — G8417 CALC BMI ABV UP PARAM F/U: HCPCS | Performed by: PODIATRIST

## 2021-04-20 PROCEDURE — 99213 OFFICE O/P EST LOW 20 MIN: CPT | Performed by: PODIATRIST

## 2021-04-20 PROCEDURE — G8536 NO DOC ELDER MAL SCRN: HCPCS | Performed by: PODIATRIST

## 2021-04-20 PROCEDURE — G8400 PT W/DXA NO RESULTS DOC: HCPCS | Performed by: PODIATRIST

## 2021-04-20 NOTE — PROGRESS NOTES
Chief Complaint   Patient presents with    Results     pt states she is here to go over CT results     1. Have you been to the ER, urgent care clinic since your last visit? Hospitalized since your last visit? No    2. Have you seen or consulted any other health care providers outside of the [address] [place] [address] since your last visit? Include any pap smears or colon screening.  No  PCP-Dr Juan Ramon Mota

## 2021-04-28 ENCOUNTER — TELEPHONE (OUTPATIENT)
Dept: ENT CLINIC | Age: 71
End: 2021-04-28

## 2021-04-28 NOTE — PROGRESS NOTES
HISTORY OF PRESENT ILLNESS  Ercik Zhang is a 80 y.o. female is being seen in the office as a returning patient in the office complaining of left foot pain. Patient states she has a cont deformity of her left third toe and it is impinging upon her left second toe. She states this issue is causing pain, rising the level of 5 out of 10. She denies any recent trauma. She states the pain is worse when she is ambulating and close toed shoes. She states the pain is sharp and nonradiating. She denies any recent trauma. She denies any local/systemic signs infection. She states she is gone for her CT  and would like to discuss the findings. She denies any other pedal complaints    Review of Systems   Constitutional: Negative. HENT: Negative. Eyes: Negative. Respiratory: Negative. Cardiovascular: Positive for leg swelling. Gastrointestinal: Positive for heartburn and nausea. Genitourinary: Negative. Musculoskeletal: Positive for back pain and joint pain. Skin: Negative. Neurological: Positive for tingling and sensory change. Endo/Heme/Allergies: Bruises/bleeds easily. Psychiatric/Behavioral: Positive for depression. The patient is nervous/anxious. All other systems reviewed and are negative. 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     Otalgia 7/15/2020    Other and unspecified hyperlipidemia     Other chronic pulmonary heart diseases     Other pulmonary embolism and infarction 2007/08    Tremors of nervous system      Family History   Problem Relation Age of Onset    Diabetes Other     Cancer Other     Heart Attack Neg Hx     Sudden Death Neg Hx      Social History     Socioeconomic History    Marital status:      Spouse name: Not on file    Number of children: Not on file    Years of education: Not on file    Highest education level: Not on file   Occupational History    Not on file   Social Needs    Financial resource strain: Not on file    Food insecurity     Worry: Not on file     Inability: Not on file    Transportation needs     Medical: Not on file     Non-medical: Not on file   Tobacco Use    Smoking status: Never Smoker    Smokeless tobacco: Never Used   Substance and Sexual Activity    Alcohol use: No    Drug use: No    Sexual activity: Not on file   Lifestyle    Physical activity     Days per week: Not on file     Minutes per session: Not on file    Stress: Not on file   Relationships    Social connections     Talks on phone: Not on file     Gets together: Not on file     Attends Sikh service: Not on file     Active member of club or organization: Not on file     Attends meetings of clubs or organizations: Not on file     Relationship status: Not on file    Intimate partner violence     Fear of current or ex partner: Not on file     Emotionally abused: Not on file     Physically abused: Not on file     Forced sexual activity: Not on file   Other Topics Concern    Not on file   Social History Narrative    Not on file     Past Surgical History:   Procedure Laterality Date    HX BACK SURGERY      HX BREAST RECONSTRUCTION  01/2012    both    HX CHOLECYSTECTOMY      HX HEART VALVE SURGERY      HX HERNIA REPAIR      midline incisional herniorrhaphy    HX MASTECTOMY  02/2011    HX MAXILLOFACIAL      HX PACEMAKER       Current Outpatient Medications   Medication Instructions    albuterol (Ventolin HFA) 90 mcg/actuation inhaler Inhalation    allopurinoL (ZYLOPRIM) 100 mg, 2 TIMES DAILY    aspirin 81 mg    betamethasone dipropionate (DIPROSONE) 0.05 % topical cream Topical, 2 TIMES DAILY    budesonide-formoteroL (Symbicort) 160-4.5 mcg/actuation HFAA 2 Puffs, Inhalation    CALCIUM CARB/VIT D3/MINERALS (CALCIUM-VITAMIN D PO) 50,000 mg, EVERY 7 DAYS    carvediloL (COREG) 3.125 mg tablet Oral, 2 TIMES DAILY WITH MEALS    celecoxib (CELEBREX) 200 mg capsule 2 TIMES DAILY    Cholestyramine-Aspartame (Cholestyramine Light) 4 gram powder Oral, 3 TIMES DAILY WITH MEALS    cyclobenzaprine (FLEXERIL) 10 mg tablet 3 TIMES DAILY AS NEEDED    dexlansoprazole (Dexilant) 60 mg CpDB capsule (delayed release) Oral    diclofenac (VOLTAREN) 1 % gel Topical, 4 TIMES DAILY    DIOVAN 160 mg tablet TAKE ONE (1) TABLET, BY MOUTH, DAILY.     escitalopram oxalate (LEXAPRO) 20 mg, DAILY    etanercept (EnbreL) 50 mg/mL (1 mL) injection SubCUTAneous    famotidine (PEPCID) 20 mg, Oral, 2 TIMES DAILY    furosemide (LASIX) 20 mg tablet Oral, DAILY    hydroCHLOROthiazide (HYDRODIURIL) 12.5 mg, Oral, DAILY AS NEEDED    hydrOXYchloroQUINE (PLAQUENIL) 200 mg, Oral, DAILY    hydrOXYzine HCL (ATARAX) 25 mg tablet Oral, 3 TIMES DAILY AS NEEDED    ivabradine (Corlanor) 5 mg tablet Oral, 2 TIMES DAILY WITH MEALS    lactulose (Constulose) 10 gram/15 mL solution Oral, 3 TIMES DAILY    lansoprazole (PREVACID) 30 mg capsule DAILY BEFORE BREAKFAST    levothyroxine (SYNTHROID) 25 mcg tablet Oral, DAILY BEFORE BREAKFAST    lifitegrast (Xiidra) 5 % dpet Ophthalmic    meclizine (ANTIVERT) 25 mg tablet Oral, 3 TIMES DAILY AS NEEDED    metoclopramide HCl (REGLAN) 5 mg, Oral, 3 TIMES DAILY BEFORE MEALS    mirabegron ER (MYRBETRIQ) 50 mg, Oral, DAILY    montelukast (SINGULAIR) 10 mg, Oral, DAILY    ondansetron hcl (ZOFRAN) 4 mg, Oral, EVERY 8 HOURS AS NEEDED    pregabalin (LYRICA) 50 mg, Oral, 3 TIMES DAILY    primidone (MYSOLINE) 250 mg, 2 TIMES DAILY    rivaroxaban (Xarelto) 20 mg tab tablet Oral, DAILY    sacubitriL-valsartan (Entresto) 24-26 mg tablet 1 Tab, Oral, 2 TIMES DAILY    silver sulfADIAZINE (SSD) 1 % topical cream Topical, DAILY    simvastatin (ZOCOR) 40 mg tablet EVERY BEDTIME    sucralfate (Carafate) 100 mg/mL suspension Oral, 4 TIMES DAILY    sucralfate (CARAFATE) 1 g, Oral, 4 TIMES DAILY    traZODone (DESYREL) 50 mg tablet Oral, EVERY BEDTIME    triamcinolone acetonide (KENALOG) 0.1 % dental paste Dental, 2 TIMES DAILY    triamcinolone acetonide (KENALOG) 0.1 % topical cream Topical, 2 TIMES DAILY, use thin layer     zolpidem (AMBIEN) 10 mg tablet BEDTIME PRN     Allergies   Allergen Reactions    Meperidine Other (comments)     Other reaction(s): psychological reaction  HALLUCINATIONS      Naproxen Not Reported This Time, Hives and Swelling    Penicillins Not Reported This Time, Hives and Swelling     Visit Vitals  /64 (BP 1 Location: Right upper arm, BP Patient Position: Sitting, BP Cuff Size: Large adult)   Pulse 75   Temp (!) 96.4 °F (35.8 °C) (Temporal)   Ht 5' 6\" (1.676 m)   Wt 200 lb (90.7 kg)   SpO2 100%   BMI 32.28 kg/m²     Physical Exam  Vitals signs reviewed. Constitutional:       Appearance: She is obese. HENT:      Head:      Comments: Normal dentition     Mouth/Throat:      Dentition: Normal dentition. Cardiovascular:      Pulses:           Dorsalis pedis pulses are 2+ on the right side and 2+ on the left side. Posterior tibial pulses are 2+ on the right side and 2+ on the left side. Pulmonary:      Effort: Pulmonary effort is normal.   Musculoskeletal:      Right lower leg: No edema. Left lower leg: No edema. Right foot: Decreased range of motion. Deformity and prominent metatarsal heads present. No bunion or Charcot foot. Left foot: Decreased range of motion. Deformity and prominent metatarsal heads present. No bunion or Charcot foot. Feet:      Right foot:      Protective Sensation: 10 sites tested.  0 sites sensed. Skin integrity: Skin integrity normal.      Toenail Condition: Right toenails are normal.      Left foot:      Protective Sensation: 10 sites tested. 0 sites sensed. Skin integrity: Skin integrity normal.      Toenail Condition: Left toenails are normal.   Skin:     General: Skin is warm. Capillary Refill: Capillary refill takes 2 to 3 seconds. Neurological:      Mental Status: She is alert and oriented to person, place, and time. Psychiatric:         Mood and Affect: Mood and affect normal.         Behavior: Behavior is cooperative. ASSESSMENT and PLAN  Charcot neuroarthropathy, left foot      Discussed and educated patient regarding her current medical condition  CT images personally reviewed and findings discussed with patient, noting osseous destruction with malposition to the midfoot  An in-depth conversation was had regarding treatment options. Instructed patient she is not an optimal surgical candidate at this time due to her ongoing midfoot deformity with poor bone quality. All parties agreed to proceed with conservative treatment. A prescription was given for patient to be fitted with a crow boot for protected ambulation. Patient to utilize for a period of 18 to 24 months.   Patient verbalized understanding

## 2021-05-07 ENCOUNTER — OFFICE VISIT (OUTPATIENT)
Dept: ENT CLINIC | Age: 71
End: 2021-05-07
Payer: MEDICARE

## 2021-05-07 ENCOUNTER — TELEPHONE (OUTPATIENT)
Dept: ENT CLINIC | Age: 71
End: 2021-05-07

## 2021-05-07 VITALS
HEART RATE: 89 BPM | RESPIRATION RATE: 18 BRPM | SYSTOLIC BLOOD PRESSURE: 124 MMHG | TEMPERATURE: 97.3 F | OXYGEN SATURATION: 99 % | WEIGHT: 200 LBS | DIASTOLIC BLOOD PRESSURE: 80 MMHG | BODY MASS INDEX: 32.14 KG/M2 | HEIGHT: 66 IN

## 2021-05-07 DIAGNOSIS — K14.6 BURNING MOUTH SYNDROME: Primary | ICD-10-CM

## 2021-05-07 DIAGNOSIS — H92.02 OTALGIA OF LEFT EAR: ICD-10-CM

## 2021-05-07 DIAGNOSIS — L50.9 URTICARIA: ICD-10-CM

## 2021-05-07 DIAGNOSIS — J30.1 ALLERGIC RHINITIS DUE TO POLLEN, UNSPECIFIED SEASONALITY: ICD-10-CM

## 2021-05-07 DIAGNOSIS — H61.21 IMPACTED CERUMEN OF RIGHT EAR: ICD-10-CM

## 2021-05-07 PROCEDURE — G8752 SYS BP LESS 140: HCPCS | Performed by: OTOLARYNGOLOGY

## 2021-05-07 PROCEDURE — G8427 DOCREV CUR MEDS BY ELIG CLIN: HCPCS | Performed by: OTOLARYNGOLOGY

## 2021-05-07 PROCEDURE — 3017F COLORECTAL CA SCREEN DOC REV: CPT | Performed by: OTOLARYNGOLOGY

## 2021-05-07 PROCEDURE — G8754 DIAS BP LESS 90: HCPCS | Performed by: OTOLARYNGOLOGY

## 2021-05-07 PROCEDURE — G8400 PT W/DXA NO RESULTS DOC: HCPCS | Performed by: OTOLARYNGOLOGY

## 2021-05-07 PROCEDURE — 69210 REMOVE IMPACTED EAR WAX UNI: CPT | Performed by: OTOLARYNGOLOGY

## 2021-05-07 PROCEDURE — G8417 CALC BMI ABV UP PARAM F/U: HCPCS | Performed by: OTOLARYNGOLOGY

## 2021-05-07 PROCEDURE — G8536 NO DOC ELDER MAL SCRN: HCPCS | Performed by: OTOLARYNGOLOGY

## 2021-05-07 PROCEDURE — 1101F PT FALLS ASSESS-DOCD LE1/YR: CPT | Performed by: OTOLARYNGOLOGY

## 2021-05-07 PROCEDURE — 99214 OFFICE O/P EST MOD 30 MIN: CPT | Performed by: OTOLARYNGOLOGY

## 2021-05-07 PROCEDURE — 1090F PRES/ABSN URINE INCON ASSESS: CPT | Performed by: OTOLARYNGOLOGY

## 2021-05-07 PROCEDURE — G8510 SCR DEP NEG, NO PLAN REQD: HCPCS | Performed by: OTOLARYNGOLOGY

## 2021-05-07 RX ORDER — HYDROXYZINE 25 MG/1
25 TABLET, FILM COATED ORAL
Qty: 60 TAB | Refills: 0 | Status: SHIPPED | OUTPATIENT
Start: 2021-05-07 | End: 2021-08-03

## 2021-05-07 RX ORDER — SYRINGE WITH NEEDLE, 1 ML 28GX1/2"
1 SYRINGE, EMPTY DISPOSABLE MISCELLANEOUS EVERY 2 WEEKS
Qty: 1 BOX | Refills: 1 | Status: SHIPPED | OUTPATIENT
Start: 2021-05-07 | End: 2022-04-27 | Stop reason: SDUPTHER

## 2021-05-07 NOTE — PROGRESS NOTES
Visit Vitals  /80 (BP 1 Location: Left upper arm, BP Patient Position: Sitting, BP Cuff Size: Adult)   Pulse 89   Temp 97.3 °F (36.3 °C) (Temporal)   Resp 18   Ht 5' 6\" (1.676 m)   Wt 200 lb (90.7 kg)   SpO2 99%   BMI 32.28 kg/m²     Chief Complaint   Patient presents with    Immunotherapy

## 2021-05-07 NOTE — PROGRESS NOTES
Subjective:    Anastasia Londono   80 y.o.   1950     Followup visit    Location - ear, nose    Quality - allergies, ear pain    Severity -  Mild/moderate    Duration - years    Timing - ongoing    Context - pt on IT, every other week now with concentrate, doing well nasal sore is better; having some left ear pain from her shoulder/neck pains    Modifying Features - allergies    Associated symptoms/signs -  Burning mouth    5/7/21 - 8 mos f/u pt is on biweekly IT overall doing well; still with occ left ear pain and soreness; she c/o occ itching under her skin on arms mostly    Review of Systems  Review of Systems   Constitutional: Negative for chills and fever. HENT: Positive for ear pain and tinnitus. Negative for hearing loss and nosebleeds. Eyes: Negative for blurred vision and double vision. Respiratory: Positive for shortness of breath. Negative for cough and sputum production. Cardiovascular: Negative for chest pain and palpitations. Gastrointestinal: Negative for heartburn, nausea and vomiting. Musculoskeletal: Positive for joint pain. Negative for neck pain. Skin: Positive for itching. Neurological: Negative for dizziness, speech change, weakness and headaches. Endo/Heme/Allergies: Negative for environmental allergies. Does not bruise/bleed easily. Psychiatric/Behavioral: Negative for memory loss. The patient does not have insomnia. 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     Otalgia 7/15/2020    Other and unspecified hyperlipidemia     Other chronic pulmonary heart diseases     Other pulmonary embolism and infarction     2007/08    Tremors of nervous system      Prior to Admission medications    Medication Sig Start Date End Date Taking? Authorizing Provider   hydrOXYzine HCL (ATARAX) 25 mg tablet Take 1 Tab by mouth three (3) times daily as needed for Itching. 5/7/21  Yes Carrington Anderson MD   Syringe with Needle, Disp, (Allergist Tray 1cc 27Gx3/8\") 1 mL 27 gauge x 3/8\" syrg 1 Syringe by Does Not Apply route Once every 2 weeks. 5/7/21  Yes Ivelisse Stover MD   pregabalin (LYRICA) 50 mg capsule Take 1 Cap by mouth three (3) times daily. Max Daily Amount: 150 mg. 8/11/20   Yaz Latham, DPM   betamethasone dipropionate (DIPROSONE) 0.05 % topical cream Apply  to affected area two (2) times a day. Provider, Historical   sucralfate (Carafate) 100 mg/mL suspension Take  by mouth four (4) times daily. Provider, Historical   carvediloL (COREG) 3.125 mg tablet Take  by mouth two (2) times daily (with meals). Provider, Historical   Cholestyramine-Aspartame (Cholestyramine Light) 4 gram powder Take  by mouth three (3) times daily (with meals). Provider, Historical   lactulose (Constulose) 10 gram/15 mL solution Take  by mouth three (3) times daily. Provider, Historical   ivabradine (Corlanor) 5 mg tablet Take  by mouth two (2) times daily (with meals). Provider, Historical   dexlansoprazole (Dexilant) 60 mg CpDB capsule (delayed release) Take  by mouth. Provider, Historical   diclofenac (VOLTAREN) 1 % gel Apply  to affected area four (4) times daily. Provider, Historical   etanercept (EnbreL) 50 mg/mL (1 mL) injection by SubCUTAneous route. Provider, Historical   sacubitriL-valsartan Shilpabennie Hernandez) 24-26 mg tablet Take 1 Tab by mouth two (2) times a day. Provider, Historical   famotidine (PEPCID) 20 mg tablet Take 20 mg by mouth two (2) times a day.     Provider, Historical   furosemide (LASIX) 20 mg tablet Take  by mouth daily. Provider, Historical   hydrOXYchloroQUINE (PLAQUENIL) 200 mg tablet Take 200 mg by mouth daily. Provider, Historical   levothyroxine (SYNTHROID) 25 mcg tablet Take  by mouth Daily (before breakfast). Provider, Historical   meclizine (ANTIVERT) 25 mg tablet Take  by mouth three (3) times daily as needed for Dizziness. Provider, Historical   metoclopramide HCl (REGLAN) 5 mg tablet Take 5 mg by mouth Before breakfast, lunch, and dinner. Provider, Historical   montelukast (SINGULAIR) 10 mg tablet Take 10 mg by mouth daily. Provider, Historical   mirabegron ER (Myrbetriq) 50 mg ER tablet Take 50 mg by mouth daily. Provider, Historical   ondansetron hcl (ZOFRAN) 4 mg tablet Take 4 mg by mouth every eight (8) hours as needed for Nausea or Vomiting. Provider, Historical   silver sulfADIAZINE (SSD) 1 % topical cream Apply  to affected area daily. Provider, Historical   budesonide-formoteroL (Symbicort) 160-4.5 mcg/actuation HFAA Take 2 Puffs by inhalation. Provider, Historical   sucralfate (CARAFATE) 1 gram tablet Take 1 g by mouth four (4) times daily. Provider, Historical   traZODone (DESYREL) 50 mg tablet Take  by mouth nightly. Provider, Historical   triamcinolone acetonide (KENALOG) 0.1 % dental paste by Dental route two (2) times a day. Provider, Historical   triamcinolone acetonide (KENALOG) 0.1 % topical cream Apply  to affected area two (2) times a day. use thin layer    Provider, Historical   albuterol (Ventolin HFA) 90 mcg/actuation inhaler Take  by inhalation. Provider, Historical   rivaroxaban (Xarelto) 20 mg tab tablet Take  by mouth daily. Provider, Historical   lifitegrast (Xiidra) 5 % dpet Apply  to eye. Provider, Historical   DIOVAN 160 mg tablet TAKE ONE (1) TABLET, BY MOUTH, DAILY.  9/18/14   Eva Mcfarlane NP   cyclobenzaprine (FLEXERIL) 10 mg tablet Take  by mouth three (3) times daily as needed. 1/2 to 1 daily as needed    Provider, Historical   Hydrochlorothiazide 12.5 mg tablet Take 12.5 mg by mouth daily as needed. 4/25/13   Rueben Severin, MD   escitalopram (LEXAPRO) 10 mg tablet Take 20 mg by mouth daily. Provider, Historical   primidone (MYSOLINE) 50 mg tablet Take 250 mg by mouth two (2) times a day. Provider, Historical   zolpidem (AMBIEN) 10 mg tablet Take  by mouth nightly as needed. 1/2 to 1 at bedtime as needed    Provider, Historical   celecoxib (CELEBREX) 200 mg capsule Take  by mouth two (2) times a day. Provider, Historical   simvastatin (ZOCOR) 40 mg tablet Take  by mouth nightly. Provider, Historical   lansoprazole (PREVACID) 30 mg capsule Take  by mouth Daily (before breakfast). Provider, Historical   CALCIUM CARB/VIT D3/MINERALS (CALCIUM-VITAMIN D PO) Take 50,000 mg by mouth every seven (7) days. Provider, Historical   aspirin 81 mg tablet Take 81 mg by mouth. Provider, Historical   allopurinol (ZYLOPRIM) 300 mg tablet Take 100 mg by mouth two (2) times a day. Provider, Historical            Objective:     Visit Vitals  /80 (BP 1 Location: Left upper arm, BP Patient Position: Sitting, BP Cuff Size: Adult)   Pulse 89   Temp 97.3 °F (36.3 °C) (Temporal)   Resp 18   Ht 5' 6\" (1.676 m)   Wt 200 lb (90.7 kg)   SpO2 99%   BMI 32.28 kg/m²        Physical Exam  Vitals signs reviewed. Constitutional:       General: She is awake. She is not in acute distress. Appearance: Normal appearance. She is well-groomed. She is obese. HENT:      Head: Normocephalic and atraumatic. Jaw: There is normal jaw occlusion. No trismus, tenderness or malocclusion. Salivary Glands: Right salivary gland is not diffusely enlarged or tender. Left salivary gland is not diffusely enlarged or tender. Right Ear: Hearing, tympanic membrane, ear canal and external ear normal. There is impacted cerumen.       Left Ear: Hearing, tympanic membrane, ear canal and external ear normal.      Ears:      Villalobos exam findings: does not lateralize. Right Rinne: AC > BC. Left Rinne: AC > BC. Nose: Laceration present. No nasal deformity, septal deviation or mucosal edema. Right Nostril: No epistaxis. Left Nostril: No epistaxis. Right Turbinates: Not enlarged, swollen or pale. Left Turbinates: Not enlarged, swollen or pale. Right Sinus: No maxillary sinus tenderness or frontal sinus tenderness. Left Sinus: No maxillary sinus tenderness or frontal sinus tenderness. Comments: Abrasion left upper nasal vestibule  Crust left septum     Mouth/Throat:      Lips: Pink. No lesions. Mouth: Mucous membranes are moist. No oral lesions. Dentition: Normal dentition. No dental caries. Tongue: No lesions. Palate: No mass and lesions. Pharynx: Oropharynx is clear. Uvula midline. No oropharyngeal exudate or posterior oropharyngeal erythema. Tonsils: No tonsillar exudate. 0 on the right. 0 on the left. Eyes:      General: Vision grossly intact. Extraocular Movements: Extraocular movements intact. Right eye: No nystagmus. Left eye: No nystagmus. Conjunctiva/sclera: Conjunctivae normal.      Pupils: Pupils are equal, round, and reactive to light. Neck:      Musculoskeletal: No edema or erythema. Thyroid: No thyroid mass, thyromegaly or thyroid tenderness. Trachea: Trachea and phonation normal. No tracheal tenderness or tracheal deviation. Cardiovascular:      Rate and Rhythm: Normal rate and regular rhythm. Pulmonary:      Effort: Pulmonary effort is normal. No respiratory distress. Breath sounds: No stridor. Musculoskeletal: Normal range of motion. General: No swelling or tenderness. Lymphadenopathy:      Cervical: No cervical adenopathy. Skin:     General: Skin is warm and dry. Findings: No lesion or rash. Neurological:      General: No focal deficit present. Mental Status: She is alert and oriented to person, place, and time. Mental status is at baseline. Cranial Nerves: Cranial nerves are intact. Coordination: Romberg sign negative. Gait: Gait is intact. Psychiatric:         Mood and Affect: Mood normal.         Behavior: Behavior normal. Behavior is cooperative. Procedure - Removal Impacted Cerumen    Indications: cerumen impaction    Ears are examined under microscope/headlight.  right ears are cleaned using otologic curette, suction, and/or alligator forceps. Assessment/Plan:     Encounter Diagnoses   Name Primary?  Burning mouth syndrome Yes    Allergic rhinitis due to pollen, unspecified seasonality     Otalgia of left ear     Urticaria     Impacted cerumen of right ear      Is overall doing well on IT, cont biweekly. New vials given today. Otalgia still likely referred from TMJ. Ear exam is clear. Right ear cleaned. Idiopathic urticaria. No obvious rash on the skin. Refill hydroxyzine. Follow-up 6 months    Orders Placed This Encounter    REMOVE IMPACTED EAR WAX    hydrOXYzine HCL (ATARAX) 25 mg tablet    Syringe with Needle, Disp, (Allergist Tray 1cc 27Gx3/8\") 1 mL 27 gauge x 3/8\" syrg     Follow-up and Dispositions    · Return in about 6 months (around 11/7/2021).

## 2021-07-12 ENCOUNTER — OFFICE VISIT (OUTPATIENT)
Dept: PODIATRY | Age: 71
End: 2021-07-12
Payer: MEDICARE

## 2021-07-12 VITALS
SYSTOLIC BLOOD PRESSURE: 134 MMHG | WEIGHT: 197.4 LBS | TEMPERATURE: 96 F | HEART RATE: 83 BPM | HEIGHT: 66 IN | BODY MASS INDEX: 31.72 KG/M2 | DIASTOLIC BLOOD PRESSURE: 67 MMHG

## 2021-07-12 DIAGNOSIS — M14.679 CHARCOT ARTHROPATHY OF MIDFOOT: Primary | ICD-10-CM

## 2021-07-12 DIAGNOSIS — M20.42 HAMMERTOE OF LEFT FOOT: ICD-10-CM

## 2021-07-12 PROCEDURE — 3017F COLORECTAL CA SCREEN DOC REV: CPT | Performed by: PODIATRIST

## 2021-07-12 PROCEDURE — G8432 DEP SCR NOT DOC, RNG: HCPCS | Performed by: PODIATRIST

## 2021-07-12 PROCEDURE — G8417 CALC BMI ABV UP PARAM F/U: HCPCS | Performed by: PODIATRIST

## 2021-07-12 PROCEDURE — G8754 DIAS BP LESS 90: HCPCS | Performed by: PODIATRIST

## 2021-07-12 PROCEDURE — 99213 OFFICE O/P EST LOW 20 MIN: CPT | Performed by: PODIATRIST

## 2021-07-12 PROCEDURE — G8752 SYS BP LESS 140: HCPCS | Performed by: PODIATRIST

## 2021-07-12 PROCEDURE — G8427 DOCREV CUR MEDS BY ELIG CLIN: HCPCS | Performed by: PODIATRIST

## 2021-07-12 PROCEDURE — G8536 NO DOC ELDER MAL SCRN: HCPCS | Performed by: PODIATRIST

## 2021-07-12 PROCEDURE — 1090F PRES/ABSN URINE INCON ASSESS: CPT | Performed by: PODIATRIST

## 2021-07-12 PROCEDURE — G8400 PT W/DXA NO RESULTS DOC: HCPCS | Performed by: PODIATRIST

## 2021-07-12 PROCEDURE — 1101F PT FALLS ASSESS-DOCD LE1/YR: CPT | Performed by: PODIATRIST

## 2021-07-12 NOTE — PROGRESS NOTES
HISTORY OF PRESENT ILLNESS  Renée Cash is a 80 y.o. female is being seen in the office as a returning patient in the office complaining of left foot pain. Patient states she has a cont deformity of her left third toe and it is impinging upon her left second toe. She states the toe spacers that were rx'ed last office visit have helped. She states this issue is causing pain, rising the level of 4 out of 10. She denies any recent trauma. She states the pain is worse when she is ambulating and close toed shoes. She states the pain is sharp and nonradiating. She denies any recent trauma. She denies any local/systemic signs infection. She denies any other pedal complaints    Review of Systems   Constitutional: Negative. HENT: Negative. Eyes: Negative. Respiratory: Negative. Cardiovascular: Positive for leg swelling. Gastrointestinal: Positive for heartburn and nausea. Genitourinary: Negative. Musculoskeletal: Positive for back pain and joint pain. Skin: Negative. Neurological: Positive for tingling and sensory change. Endo/Heme/Allergies: Bruises/bleeds easily. Psychiatric/Behavioral: Positive for depression. The patient is nervous/anxious. All other systems reviewed and are negative. 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     Otalgia 7/15/2020    Other and unspecified hyperlipidemia     Other chronic pulmonary heart diseases     Other pulmonary embolism and infarction 2007/08    Tremors of nervous system      Family History   Problem Relation Age of Onset    Diabetes Other     Cancer Other     Heart Attack Neg Hx     Sudden Death Neg Hx      Social History     Socioeconomic History    Marital status:      Spouse name: Not on file    Number of children: Not on file    Years of education: Not on file    Highest education level: Not on file   Occupational History    Not on file   Tobacco Use    Smoking status: Never Smoker    Smokeless tobacco: Never Used   Vaping Use    Vaping Use: Never used   Substance and Sexual Activity    Alcohol use: No    Drug use: No    Sexual activity: Not on file   Other Topics Concern    Not on file   Social History Narrative    Not on file     Social Determinants of Health     Financial Resource Strain:     Difficulty of Paying Living Expenses:    Food Insecurity:     Worried About Running Out of Food in the Last Year:     Ran Out of Food in the Last Year:    Transportation Needs:     Lack of Transportation (Medical):      Lack of Transportation (Non-Medical):    Physical Activity:     Days of Exercise per Week:     Minutes of Exercise per Session:    Stress:     Feeling of Stress :    Social Connections:     Frequency of Communication with Friends and Family:     Frequency of Social Gatherings with Friends and Family:     Attends Bahai Services:     Active Member of Clubs or Organizations:     Attends Club or Organization Meetings:     Marital Status:    Intimate Partner Violence:     Fear of Current or Ex-Partner:     Emotionally Abused:     Physically Abused:     Sexually Abused:      Past Surgical History:   Procedure Laterality Date    HX BACK SURGERY      HX BREAST RECONSTRUCTION  01/2012    both    HX CHOLECYSTECTOMY      HX HEART VALVE SURGERY      HX HERNIA REPAIR      midline incisional herniorrhaphy    HX MASTECTOMY  02/2011    HX MAXILLOFACIAL      HX PACEMAKER       Current Outpatient Medications   Medication Instructions    albuterol (Ventolin HFA) 90 mcg/actuation inhaler Inhalation    allopurinoL (ZYLOPRIM) 100 mg, 2 TIMES DAILY    aspirin 81 mg    betamethasone dipropionate (DIPROSONE) 0.05 % topical cream Topical, 2 TIMES DAILY    budesonide-formoteroL (Symbicort) 160-4.5 mcg/actuation HFAA 2 Puffs, Inhalation    CALCIUM CARB/VIT D3/MINERALS (CALCIUM-VITAMIN D PO) 50,000 mg, EVERY 7 DAYS    carvediloL (COREG) 3.125 mg tablet Oral, 2 TIMES DAILY WITH MEALS    celecoxib (CELEBREX) 200 mg capsule 2 TIMES DAILY    Cholestyramine-Aspartame (Cholestyramine Light) 4 gram powder Oral, 3 TIMES DAILY WITH MEALS    cyclobenzaprine (FLEXERIL) 10 mg tablet 3 TIMES DAILY AS NEEDED    dexlansoprazole (Dexilant) 60 mg CpDB capsule (delayed release) Oral    diclofenac (VOLTAREN) 1 % gel Topical, 4 TIMES DAILY    DIOVAN 160 mg tablet TAKE ONE (1) TABLET, BY MOUTH, DAILY.     escitalopram oxalate (LEXAPRO) 20 mg, DAILY    etanercept (EnbreL) 50 mg/mL (1 mL) injection SubCUTAneous    famotidine (PEPCID) 20 mg, Oral, 2 TIMES DAILY    furosemide (LASIX) 20 mg tablet Oral, DAILY    hydroCHLOROthiazide (HYDRODIURIL) 12.5 mg, Oral, DAILY AS NEEDED    hydrOXYchloroQUINE (PLAQUENIL) 200 mg, Oral, DAILY    hydrOXYzine HCL (ATARAX) 25 mg, Oral, 3 TIMES DAILY AS NEEDED    ivabradine (Corlanor) 5 mg tablet Oral, 2 TIMES DAILY WITH MEALS    lactulose (Constulose) 10 gram/15 mL solution Oral, 3 TIMES DAILY    lansoprazole (PREVACID) 30 mg capsule DAILY BEFORE BREAKFAST    levothyroxine (SYNTHROID) 25 mcg tablet Oral, DAILY BEFORE BREAKFAST    lifitegrast (Xiidra) 5 % dpet Ophthalmic    meclizine (ANTIVERT) 25 mg tablet Oral, 3 TIMES DAILY AS NEEDED    metoclopramide HCl (REGLAN) 5 mg, Oral, 3 TIMES DAILY BEFORE MEALS    mirabegron ER (MYRBETRIQ) 50 mg, Oral, DAILY    montelukast (SINGULAIR) 10 mg, Oral, DAILY    ondansetron hcl (ZOFRAN) 4 mg, Oral, EVERY 8 HOURS AS NEEDED    pregabalin (LYRICA) 50 mg, Oral, 3 TIMES DAILY    primidone (MYSOLINE) 250 mg, 2 TIMES DAILY    rivaroxaban (Xarelto) 20 mg tab tablet Oral, DAILY    sacubitriL-valsartan (Entresto) 24-26 mg tablet 1 Tablet, Oral, 2 TIMES DAILY    silver sulfADIAZINE (SSD) 1 % topical cream Topical, DAILY    simvastatin (ZOCOR) 40 mg tablet EVERY BEDTIME    sucralfate (Carafate) 100 mg/mL suspension Oral, 4 TIMES DAILY    sucralfate (CARAFATE) 1 g, Oral, 4 TIMES DAILY    Syringe with Needle, Disp, (Allergist Tray 1cc 27Gx3/8\") 1 mL 27 gauge x 3/8\" syrg 1 Syringe, Does Not Apply, EVERY 2 WEEKS    traZODone (DESYREL) 50 mg tablet Oral, EVERY BEDTIME    triamcinolone acetonide (KENALOG) 0.1 % dental paste Dental, 2 TIMES DAILY    triamcinolone acetonide (KENALOG) 0.1 % topical cream Topical, 2 TIMES DAILY, use thin layer     zolpidem (AMBIEN) 10 mg tablet BEDTIME PRN     Allergies   Allergen Reactions    Meperidine Other (comments)     Other reaction(s): psychological reaction  HALLUCINATIONS      Naproxen Not Reported This Time, Hives and Swelling    Penicillins Not Reported This Time, Hives and Swelling     Visit Vitals  /67 (BP 1 Location: Left upper arm, BP Patient Position: Sitting, BP Cuff Size: Adult)   Pulse 83   Temp (!) 96 °F (35.6 °C) (Temporal)   Ht 5' 6\" (1.676 m)   Wt 197 lb 6.4 oz (89.5 kg)   BMI 31.86 kg/m²     Physical Exam  Vitals reviewed. Constitutional:       Appearance: She is obese. HENT:      Head:      Comments: Normal dentition     Mouth/Throat:      Dentition: Normal dentition. Cardiovascular:      Pulses:           Dorsalis pedis pulses are 2+ on the right side and 2+ on the left side. Posterior tibial pulses are 2+ on the right side and 2+ on the left side. Pulmonary:      Effort: Pulmonary effort is normal.   Musculoskeletal:      Right lower leg: No edema. Left lower leg: No edema. Right foot: Decreased range of motion. Deformity and prominent metatarsal heads present.  No bunion or Charcot foot. Left foot: Decreased range of motion. Deformity, Charcot foot and prominent metatarsal heads present. No bunion. Feet:      Right foot:      Protective Sensation: 10 sites tested. 0 sites sensed. Skin integrity: Skin integrity normal.      Toenail Condition: Right toenails are normal.      Left foot:      Protective Sensation: 10 sites tested. 0 sites sensed. Skin integrity: Skin integrity normal.      Toenail Condition: Left toenails are normal.   Skin:     General: Skin is warm. Capillary Refill: Capillary refill takes 2 to 3 seconds. Neurological:      Mental Status: She is alert and oriented to person, place, and time. Psychiatric:         Mood and Affect: Mood and affect normal.         Behavior: Behavior is cooperative. ASSESSMENT and PLAN    ICD-10-CM ICD-9-CM    1. Charcot arthropathy of midfoot  M14.679 094.0      713.5    2.  Hammertoe of left foot  M20.42 735.4          Discussed and educated patient regarding her current medical condition  Encouraged cont dance with her offloading toe spacers and custom molded offloading shoe gear

## 2021-07-12 NOTE — PROGRESS NOTES
1. Have you been to the ER, urgent care clinic since your last visit? Hospitalized since your last visit? No    2. Have you seen or consulted any other health care providers outside of the [address] [place] [address] since your last visit? Include any pap smears or colon screening.  No     Chief Complaint   Patient presents with    Foot Exam     f/u charcot foot

## 2021-07-21 DIAGNOSIS — M1A.0790 CHRONIC GOUT OF FOOT, UNSPECIFIED CAUSE, UNSPECIFIED LATERALITY: Primary | ICD-10-CM

## 2021-08-02 ENCOUNTER — TELEPHONE (OUTPATIENT)
Dept: PODIATRY | Age: 71
End: 2021-08-02

## 2021-08-03 RX ORDER — HYDROXYZINE 25 MG/1
TABLET, FILM COATED ORAL
Qty: 60 TABLET | Refills: 0 | Status: SHIPPED | OUTPATIENT
Start: 2021-08-03 | End: 2022-05-24 | Stop reason: SDUPTHER

## 2021-08-19 ENCOUNTER — TELEPHONE (OUTPATIENT)
Dept: PODIATRY | Age: 71
End: 2021-08-19

## 2021-09-07 DIAGNOSIS — G62.9 NEUROPATHY: ICD-10-CM

## 2021-09-07 RX ORDER — PREGABALIN 50 MG/1
CAPSULE ORAL
Qty: 90 CAPSULE | Refills: 2 | Status: SHIPPED | OUTPATIENT
Start: 2021-09-07 | End: 2021-10-14 | Stop reason: SDUPTHER

## 2021-09-08 ENCOUNTER — TELEPHONE (OUTPATIENT)
Dept: PODIATRY | Age: 71
End: 2021-09-08

## 2021-09-08 DIAGNOSIS — M14.679 CHARCOT ARTHROPATHY OF MIDFOOT: Primary | ICD-10-CM

## 2021-09-27 ENCOUNTER — TELEPHONE (OUTPATIENT)
Dept: ENT CLINIC | Age: 71
End: 2021-09-27

## 2021-09-28 ENCOUNTER — TELEPHONE (OUTPATIENT)
Dept: PODIATRY | Age: 71
End: 2021-09-28

## 2021-10-12 ENCOUNTER — OFFICE VISIT (OUTPATIENT)
Dept: PODIATRY | Age: 71
End: 2021-10-12
Payer: MEDICARE

## 2021-10-12 ENCOUNTER — TELEPHONE (OUTPATIENT)
Dept: PODIATRY | Age: 71
End: 2021-10-12

## 2021-10-12 ENCOUNTER — OFFICE VISIT (OUTPATIENT)
Dept: ENT CLINIC | Age: 71
End: 2021-10-12

## 2021-10-12 VITALS
DIASTOLIC BLOOD PRESSURE: 78 MMHG | SYSTOLIC BLOOD PRESSURE: 140 MMHG | OXYGEN SATURATION: 98 % | RESPIRATION RATE: 18 BRPM | BODY MASS INDEX: 30.05 KG/M2 | HEIGHT: 66 IN | WEIGHT: 187 LBS | HEART RATE: 71 BPM | TEMPERATURE: 98.4 F

## 2021-10-12 VITALS
HEART RATE: 81 BPM | SYSTOLIC BLOOD PRESSURE: 135 MMHG | BODY MASS INDEX: 30.05 KG/M2 | HEIGHT: 66 IN | DIASTOLIC BLOOD PRESSURE: 78 MMHG | WEIGHT: 187 LBS | TEMPERATURE: 96 F | OXYGEN SATURATION: 100 %

## 2021-10-12 DIAGNOSIS — H69.83 ETD (EUSTACHIAN TUBE DYSFUNCTION), BILATERAL: ICD-10-CM

## 2021-10-12 DIAGNOSIS — M14.679 CHARCOT ARTHROPATHY OF MIDFOOT: Primary | ICD-10-CM

## 2021-10-12 DIAGNOSIS — J30.1 ALLERGIC RHINITIS DUE TO POLLEN, UNSPECIFIED SEASONALITY: Primary | ICD-10-CM

## 2021-10-12 DIAGNOSIS — H92.02 OTALGIA OF LEFT EAR: ICD-10-CM

## 2021-10-12 PROCEDURE — G8427 DOCREV CUR MEDS BY ELIG CLIN: HCPCS | Performed by: PODIATRIST

## 2021-10-12 PROCEDURE — G8400 PT W/DXA NO RESULTS DOC: HCPCS | Performed by: OTOLARYNGOLOGY

## 2021-10-12 PROCEDURE — 1090F PRES/ABSN URINE INCON ASSESS: CPT | Performed by: PODIATRIST

## 2021-10-12 PROCEDURE — 99214 OFFICE O/P EST MOD 30 MIN: CPT | Performed by: OTOLARYNGOLOGY

## 2021-10-12 PROCEDURE — G8400 PT W/DXA NO RESULTS DOC: HCPCS | Performed by: PODIATRIST

## 2021-10-12 PROCEDURE — 1101F PT FALLS ASSESS-DOCD LE1/YR: CPT | Performed by: PODIATRIST

## 2021-10-12 PROCEDURE — G8536 NO DOC ELDER MAL SCRN: HCPCS | Performed by: OTOLARYNGOLOGY

## 2021-10-12 PROCEDURE — G8427 DOCREV CUR MEDS BY ELIG CLIN: HCPCS | Performed by: OTOLARYNGOLOGY

## 2021-10-12 PROCEDURE — 99213 OFFICE O/P EST LOW 20 MIN: CPT | Performed by: PODIATRIST

## 2021-10-12 PROCEDURE — G8754 DIAS BP LESS 90: HCPCS | Performed by: PODIATRIST

## 2021-10-12 PROCEDURE — 1101F PT FALLS ASSESS-DOCD LE1/YR: CPT | Performed by: OTOLARYNGOLOGY

## 2021-10-12 PROCEDURE — 1090F PRES/ABSN URINE INCON ASSESS: CPT | Performed by: OTOLARYNGOLOGY

## 2021-10-12 PROCEDURE — G8432 DEP SCR NOT DOC, RNG: HCPCS | Performed by: OTOLARYNGOLOGY

## 2021-10-12 PROCEDURE — G8752 SYS BP LESS 140: HCPCS | Performed by: OTOLARYNGOLOGY

## 2021-10-12 PROCEDURE — G8754 DIAS BP LESS 90: HCPCS | Performed by: OTOLARYNGOLOGY

## 2021-10-12 PROCEDURE — 3017F COLORECTAL CA SCREEN DOC REV: CPT | Performed by: PODIATRIST

## 2021-10-12 PROCEDURE — G8536 NO DOC ELDER MAL SCRN: HCPCS | Performed by: PODIATRIST

## 2021-10-12 PROCEDURE — G8417 CALC BMI ABV UP PARAM F/U: HCPCS | Performed by: OTOLARYNGOLOGY

## 2021-10-12 PROCEDURE — G8417 CALC BMI ABV UP PARAM F/U: HCPCS | Performed by: PODIATRIST

## 2021-10-12 PROCEDURE — G8432 DEP SCR NOT DOC, RNG: HCPCS | Performed by: PODIATRIST

## 2021-10-12 PROCEDURE — 3017F COLORECTAL CA SCREEN DOC REV: CPT | Performed by: OTOLARYNGOLOGY

## 2021-10-12 PROCEDURE — G8752 SYS BP LESS 140: HCPCS | Performed by: PODIATRIST

## 2021-10-12 RX ORDER — PREDNISONE 10 MG/1
TABLET ORAL
Qty: 20 TABLET | Refills: 0 | Status: SHIPPED | OUTPATIENT
Start: 2021-10-12

## 2021-10-12 NOTE — PROGRESS NOTES
Subjective:    Thelma Velazco   80 y.o.   1950     Followup visit    Location - ear, nose    Quality - allergies, ear pain    Severity -  Mild/moderate    Duration - years    Timing - ongoing    Context - pt on IT, every other week now with concentrate, doing well nasal sore is better; having some left ear pain from her shoulder/neck pains    Modifying Features - allergies    Associated symptoms/signs -  Burning mouth    5/7/21 - 8 mos f/u pt is on biweekly IT overall doing well; still with occ left ear pain and soreness; she c/o occ itching under her skin on arms mostly    10/12/21 - 5 mos f/u - pt c/o \"not feeling well\" she is c/o left ear pain, more nasal congestion; not much drainage; went to urgent care got doxy and was told right ear perforation and fluid on left; side, not getting better    Review of Systems  Review of Systems   Constitutional: Negative for chills and fever. HENT: Positive for congestion, ear pain and tinnitus. Negative for hearing loss and nosebleeds. Eyes: Negative for blurred vision and double vision. Respiratory: Positive for shortness of breath. Negative for cough and sputum production. Cardiovascular: Negative for chest pain and palpitations. Gastrointestinal: Negative for heartburn, nausea and vomiting. Musculoskeletal: Positive for joint pain. Negative for neck pain. Skin: Positive for itching. Neurological: Positive for headaches. Negative for dizziness, speech change and weakness. Endo/Heme/Allergies: Negative for environmental allergies. Does not bruise/bleed easily. Psychiatric/Behavioral: Negative for memory loss. The patient does not have insomnia.           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  Otalgia 7/15/2020    Other and unspecified hyperlipidemia     Other chronic pulmonary heart diseases     Other pulmonary embolism and infarction     2007/08    Tremors of nervous system      Prior to Admission medications    Medication Sig Start Date End Date Taking? Authorizing Provider   predniSONE (DELTASONE) 10 mg tablet 4 tab PO days 1-2   3 tab PO days 3-4     2 tab PO days 5-6      1 tab PO days 7-8 10/12/21  Yes Melba Romberg, MD   pregabalin (LYRICA) 50 mg capsule Take one capsule by mouth THREE TIMES DAILY 9/7/21   Alysia Latham DPM   hydrOXYzine HCL (ATARAX) 25 mg tablet TAKE ONE TABLET BY MOUTH THREE TIMES DAILY AS NEEDED FOR ITCHING 8/3/21   Melba Romberg, MD   Syringe with Needle, Disp, (Allergist Tray 1cc 27Gx3/8\") 1 mL 27 gauge x 3/8\" syrg 1 Syringe by Does Not Apply route Once every 2 weeks. 5/7/21   Melba Romberg, MD   betamethasone dipropionate (DIPROSONE) 0.05 % topical cream Apply  to affected area two (2) times a day. Provider, Historical   sucralfate (Carafate) 100 mg/mL suspension Take  by mouth four (4) times daily. Provider, Historical   carvediloL (COREG) 3.125 mg tablet Take  by mouth two (2) times daily (with meals). Provider, Historical   Cholestyramine-Aspartame (Cholestyramine Light) 4 gram powder Take  by mouth three (3) times daily (with meals). Provider, Historical   lactulose (Constulose) 10 gram/15 mL solution Take  by mouth three (3) times daily. Provider, Historical   ivabradine (Corlanor) 5 mg tablet Take  by mouth two (2) times daily (with meals). Provider, Historical   dexlansoprazole (Dexilant) 60 mg CpDB capsule (delayed release) Take  by mouth.     Provider, Historical   diclofenac (VOLTAREN) 1 % gel Apply  to affected area four (4) times daily. Provider, Historical   etanercept (EnbreL) 50 mg/mL (1 mL) injection by SubCUTAneous route. Provider, Historical   sacubitriL-valsartan Kaye Conquest) 24-26 mg tablet Take 1 Tab by mouth two (2) times a day. Provider, Historical   famotidine (PEPCID) 20 mg tablet Take 20 mg by mouth two (2) times a day. Provider, Historical   furosemide (LASIX) 20 mg tablet Take  by mouth daily. Provider, Historical   hydrOXYchloroQUINE (PLAQUENIL) 200 mg tablet Take 200 mg by mouth daily. Provider, Historical   levothyroxine (SYNTHROID) 25 mcg tablet Take  by mouth Daily (before breakfast). Provider, Historical   meclizine (ANTIVERT) 25 mg tablet Take  by mouth three (3) times daily as needed for Dizziness. Provider, Historical   metoclopramide HCl (REGLAN) 5 mg tablet Take 5 mg by mouth Before breakfast, lunch, and dinner. Provider, Historical   montelukast (SINGULAIR) 10 mg tablet Take 10 mg by mouth daily. Provider, Historical   mirabegron ER (Myrbetriq) 50 mg ER tablet Take 50 mg by mouth daily. Provider, Historical   ondansetron hcl (ZOFRAN) 4 mg tablet Take 4 mg by mouth every eight (8) hours as needed for Nausea or Vomiting. Provider, Historical   silver sulfADIAZINE (SSD) 1 % topical cream Apply  to affected area daily. Provider, Historical   budesonide-formoteroL (Symbicort) 160-4.5 mcg/actuation HFAA Take 2 Puffs by inhalation. Provider, Historical   sucralfate (CARAFATE) 1 gram tablet Take 1 g by mouth four (4) times daily. Provider, Historical   traZODone (DESYREL) 50 mg tablet Take  by mouth nightly. Provider, Historical   triamcinolone acetonide (KENALOG) 0.1 % dental paste by Dental route two (2) times a day. Provider, Historical   triamcinolone acetonide (KENALOG) 0.1 % topical cream Apply  to affected area two (2) times a day.  use thin layer    Provider, Historical   albuterol (Ventolin HFA) 90 mcg/actuation inhaler Take by inhalation. Provider, Historical   rivaroxaban (Xarelto) 20 mg tab tablet Take  by mouth daily. Provider, Historical   lifitegrast (Xiidra) 5 % dpet Apply  to eye. Provider, Historical   DIOVAN 160 mg tablet TAKE ONE (1) TABLET, BY MOUTH, DAILY. 9/18/14   Eva Mcfarlane NP   cyclobenzaprine (FLEXERIL) 10 mg tablet Take  by mouth three (3) times daily as needed. 1/2 to 1 daily as needed    Provider, Historical   Hydrochlorothiazide 12.5 mg tablet Take 12.5 mg by mouth daily as needed. 4/25/13   Mushtaq Rodriguez MD   escitalopram (LEXAPRO) 10 mg tablet Take 20 mg by mouth daily. Provider, Historical   primidone (MYSOLINE) 50 mg tablet Take 250 mg by mouth two (2) times a day. Provider, Historical   zolpidem (AMBIEN) 10 mg tablet Take  by mouth nightly as needed. 1/2 to 1 at bedtime as needed    Provider, Historical   celecoxib (CELEBREX) 200 mg capsule Take  by mouth two (2) times a day. Provider, Historical   simvastatin (ZOCOR) 40 mg tablet Take  by mouth nightly. Provider, Historical   lansoprazole (PREVACID) 30 mg capsule Take  by mouth Daily (before breakfast). Provider, Historical   CALCIUM CARB/VIT D3/MINERALS (CALCIUM-VITAMIN D PO) Take 50,000 mg by mouth every seven (7) days. Provider, Historical   aspirin 81 mg tablet Take 81 mg by mouth. Provider, Historical   allopurinol (ZYLOPRIM) 300 mg tablet Take 100 mg by mouth two (2) times a day. Provider, Historical            Objective:     Visit Vitals  BP (!) 140/78 (BP 1 Location: Left upper arm, BP Patient Position: Sitting, BP Cuff Size: Adult)   Pulse 71   Temp 98.4 °F (36.9 °C) (Temporal)   Resp 18   Ht 5' 6\" (1.676 m)   Wt 187 lb (84.8 kg)   SpO2 98%   BMI 30.18 kg/m²        Physical Exam  Vitals reviewed. Constitutional:       General: She is awake. She is not in acute distress. Appearance: Normal appearance. She is well-groomed. She is obese. HENT:      Head: Normocephalic and atraumatic. Jaw:  There is normal jaw occlusion. No trismus, tenderness or malocclusion. Salivary Glands: Right salivary gland is not diffusely enlarged or tender. Left salivary gland is not diffusely enlarged or tender. Right Ear: Hearing, tympanic membrane, ear canal and external ear normal. There is no impacted cerumen. Left Ear: Hearing, tympanic membrane, ear canal and external ear normal. There is no impacted cerumen. Ears:      Villalobos exam findings: does not lateralize. Right Rinne: AC > BC. Left Rinne: AC > BC. Nose: No nasal deformity, septal deviation, laceration or mucosal edema. Right Nostril: No epistaxis. Left Nostril: No epistaxis. Right Turbinates: Not enlarged, swollen or pale. Left Turbinates: Not enlarged, swollen or pale. Right Sinus: No maxillary sinus tenderness or frontal sinus tenderness. Left Sinus: No maxillary sinus tenderness or frontal sinus tenderness. Mouth/Throat:      Lips: Pink. No lesions. Mouth: Mucous membranes are moist. No oral lesions. Dentition: Normal dentition. No dental caries. Tongue: No lesions. Palate: No mass and lesions. Pharynx: Oropharynx is clear. Uvula midline. No oropharyngeal exudate or posterior oropharyngeal erythema. Tonsils: No tonsillar exudate. 0 on the right. 0 on the left. Eyes:      General: Vision grossly intact. Extraocular Movements: Extraocular movements intact. Right eye: No nystagmus. Left eye: No nystagmus. Conjunctiva/sclera: Conjunctivae normal.      Pupils: Pupils are equal, round, and reactive to light. Neck:      Thyroid: No thyroid mass, thyromegaly or thyroid tenderness. Trachea: Trachea and phonation normal. No tracheal tenderness or tracheal deviation. Cardiovascular:      Rate and Rhythm: Normal rate and regular rhythm. Pulmonary:      Effort: Pulmonary effort is normal. No respiratory distress. Breath sounds: No stridor. Musculoskeletal:         General: No swelling or tenderness. Normal range of motion. Cervical back: No edema or erythema. Lymphadenopathy:      Cervical: No cervical adenopathy. Skin:     General: Skin is warm and dry. Findings: No lesion or rash. Neurological:      General: No focal deficit present. Mental Status: She is alert and oriented to person, place, and time. Mental status is at baseline. Cranial Nerves: Cranial nerves are intact. Coordination: Romberg sign negative. Gait: Gait is intact. Psychiatric:         Mood and Affect: Mood normal.         Behavior: Behavior normal. Behavior is cooperative. Assessment/Plan:     Encounter Diagnoses   Name Primary?  Allergic rhinitis due to pollen, unspecified seasonality Yes    Otalgia of left ear     ETD (Eustachian tube dysfunction), bilateral      Is overall doing well on IT, cont biweekly. Otalgia is referred or ETD related. More chronic. Cont tylenol, heat    ETD/rhinitis - prednisone taper    Fu 1 mo    Orders Placed This Encounter    predniSONE (DELTASONE) 10 mg tablet     Follow-up and Dispositions    · Return in about 1 month (around 11/12/2021).

## 2021-10-12 NOTE — PROGRESS NOTES
Chief Complaint   Patient presents with    Follow-up     pt states she is here to talk about tx options     1. Have you been to the ER, urgent care clinic since your last visit? Hospitalized since your last visit? No    2. Have you seen or consulted any other health care providers outside of the [address] [place] [address] since your last visit? Include any pap smears or colon screening.  No  PCP-Dr Myesha Lawrence

## 2021-10-12 NOTE — PROGRESS NOTES
Visit Vitals  BP (!) 140/78 (BP 1 Location: Left upper arm, BP Patient Position: Sitting, BP Cuff Size: Adult)   Pulse 71   Temp 98.4 °F (36.9 °C) (Temporal)   Resp 18   Ht 5' 6\" (1.676 m)   Wt 187 lb (84.8 kg)   SpO2 98%   BMI 30.18 kg/m²     Chief Complaint   Patient presents with    Follow-up     left ear pain and congestion

## 2021-10-12 NOTE — PROGRESS NOTES
HISTORY OF PRESENT ILLNESS  hCago Fish is a 80 y.o. female is being seen in the office as a returning patient in the office complaining of left foot pain. Patient states she has a cont deformity of her left third toe and it is impinging upon her left second toe. She states the toe spacers that were rx'ed last office visit have helped. She states the bone protruding from the plantar aspect of the midfoot (not breaking the skin) has continued to worsen. She states she has seen a doctor for a second opinion (Dr. Sean Fitch), this has agreed with conservative treatment. She states this issue is causing pain, rising the level of 5 out of 10. She denies any recent trauma. She states the pain is worse when she is ambulating and close toed shoes. She states the pain is sharp and nonradiating. She denies any recent trauma. She denies any local/systemic signs infection. She denies any other pedal complaints    Review of Systems   Constitutional: Negative. HENT: Negative. Eyes: Negative. Respiratory: Negative. Cardiovascular: Positive for leg swelling. Gastrointestinal: Positive for heartburn and nausea. Genitourinary: Negative. Musculoskeletal: Positive for back pain and joint pain. Skin: Negative. Neurological: Positive for tingling and sensory change. Endo/Heme/Allergies: Bruises/bleeds easily. Psychiatric/Behavioral: Positive for depression. The patient is nervous/anxious. All other systems reviewed and are negative.     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  Otalgia 7/15/2020    Other and unspecified hyperlipidemia     Other chronic pulmonary heart diseases     Other pulmonary embolism and infarction     2007/08    Tremors of nervous system      Family History   Problem Relation Age of Onset    Diabetes Other     Cancer Other     Heart Attack Neg Hx     Sudden Death Neg Hx      Social History     Socioeconomic History    Marital status:      Spouse name: Not on file    Number of children: Not on file    Years of education: Not on file    Highest education level: Not on file   Occupational History    Not on file   Tobacco Use    Smoking status: Never Smoker    Smokeless tobacco: Never Used   Vaping Use    Vaping Use: Never used   Substance and Sexual Activity    Alcohol use: No    Drug use: No    Sexual activity: Not on file   Other Topics Concern    Not on file   Social History Narrative    Not on file     Social Determinants of Health     Financial Resource Strain:     Difficulty of Paying Living Expenses:    Food Insecurity:     Worried About Running Out of Food in the Last Year:     Jose of Food in the Last Year:    Transportation Needs:     Lack of Transportation (Medical):      Lack of Transportation (Non-Medical):    Physical Activity:     Days of Exercise per Week:     Minutes of Exercise per Session:    Stress:     Feeling of Stress :    Social Connections:     Frequency of Communication with Friends and Family:     Frequency of Social Gatherings with Friends and Family:     Attends Taoist Services:     Active Member of Clubs or Organizations:     Attends Club or Organization Meetings:     Marital Status:    Intimate Partner Violence:     Fear of Current or Ex-Partner:     Emotionally Abused:     Physically Abused:     Sexually Abused:      Past Surgical History:   Procedure Laterality Date    HX BACK SURGERY      HX BREAST RECONSTRUCTION  01/2012    both    HX CHOLECYSTECTOMY      HX HEART VALVE SURGERY      HX HERNIA REPAIR      midline incisional herniorrhaphy    HX MASTECTOMY  02/2011    HX MAXILLOFACIAL      HX PACEMAKER       Current Outpatient Medications   Medication Instructions    albuterol (Ventolin HFA) 90 mcg/actuation inhaler Inhalation    allopurinoL (ZYLOPRIM) 100 mg, 2 TIMES DAILY    aspirin 81 mg    betamethasone dipropionate (DIPROSONE) 0.05 % topical cream Topical, 2 TIMES DAILY    budesonide-formoteroL (Symbicort) 160-4.5 mcg/actuation HFAA 2 Puffs, Inhalation    CALCIUM CARB/VIT D3/MINERALS (CALCIUM-VITAMIN D PO) 50,000 mg, EVERY 7 DAYS    carvediloL (COREG) 3.125 mg tablet Oral, 2 TIMES DAILY WITH MEALS    celecoxib (CELEBREX) 200 mg capsule 2 TIMES DAILY    Cholestyramine-Aspartame (Cholestyramine Light) 4 gram powder Oral, 3 TIMES DAILY WITH MEALS    cyclobenzaprine (FLEXERIL) 10 mg tablet 3 TIMES DAILY AS NEEDED    dexlansoprazole (Dexilant) 60 mg CpDB capsule (delayed release) Oral    diclofenac (VOLTAREN) 1 % gel Topical, 4 TIMES DAILY    DIOVAN 160 mg tablet TAKE ONE (1) TABLET, BY MOUTH, DAILY.     escitalopram oxalate (LEXAPRO) 20 mg, DAILY    etanercept (EnbreL) 50 mg/mL (1 mL) injection SubCUTAneous    famotidine (PEPCID) 20 mg, Oral, 2 TIMES DAILY    furosemide (LASIX) 20 mg tablet Oral, DAILY    hydroCHLOROthiazide (HYDRODIURIL) 12.5 mg, Oral, DAILY AS NEEDED    hydrOXYchloroQUINE (PLAQUENIL) 200 mg, Oral, DAILY    hydrOXYzine HCL (ATARAX) 25 mg tablet TAKE ONE TABLET BY MOUTH THREE TIMES DAILY AS NEEDED FOR ITCHING    ivabradine (Corlanor) 5 mg tablet Oral, 2 TIMES DAILY WITH MEALS    lactulose (Constulose) 10 gram/15 mL solution Oral, 3 TIMES DAILY    lansoprazole (PREVACID) 30 mg capsule DAILY BEFORE BREAKFAST    levothyroxine (SYNTHROID) 25 mcg tablet Oral, DAILY BEFORE BREAKFAST    lifitegrast (Xiidra) 5 % dpet Ophthalmic    meclizine (ANTIVERT) 25 mg tablet Oral, 3 TIMES DAILY AS NEEDED    metoclopramide HCl (REGLAN) 5 mg, Oral, 3 TIMES DAILY BEFORE MEALS    mirabegron ER (MYRBETRIQ) 50 mg, Oral, DAILY    montelukast (SINGULAIR) 10 mg, Oral, DAILY    ondansetron hcl (ZOFRAN) 4 mg, Oral, EVERY 8 HOURS AS NEEDED    pregabalin (LYRICA) 50 mg capsule Take one capsule by mouth THREE TIMES DAILY    primidone (MYSOLINE) 250 mg, 2 TIMES DAILY    rivaroxaban (Xarelto) 20 mg tab tablet Oral, DAILY    sacubitriL-valsartan (Entresto) 24-26 mg tablet 1 Tablet, Oral, 2 TIMES DAILY    silver sulfADIAZINE (SSD) 1 % topical cream Topical, DAILY    simvastatin (ZOCOR) 40 mg tablet EVERY BEDTIME    sucralfate (Carafate) 100 mg/mL suspension Oral, 4 TIMES DAILY    sucralfate (CARAFATE) 1 g, Oral, 4 TIMES DAILY    Syringe with Needle, Disp, (Allergist Tray 1cc 27Gx3/8\") 1 mL 27 gauge x 3/8\" syrg 1 Syringe, Does Not Apply, EVERY 2 WEEKS    traZODone (DESYREL) 50 mg tablet Oral, EVERY BEDTIME    triamcinolone acetonide (KENALOG) 0.1 % dental paste Dental, 2 TIMES DAILY    triamcinolone acetonide (KENALOG) 0.1 % topical cream Topical, 2 TIMES DAILY, use thin layer     zolpidem (AMBIEN) 10 mg tablet BEDTIME PRN     Allergies   Allergen Reactions    Meperidine Other (comments)     Other reaction(s): psychological reaction  HALLUCINATIONS      Naproxen Not Reported This Time, Hives and Swelling    Penicillins Not Reported This Time, Hives and Swelling     Visit Vitals  /78 (BP 1 Location: Left upper arm, BP Patient Position: Sitting, BP Cuff Size: Large adult)   Pulse 81   Temp (!) 96 °F (35.6 °C)   Ht 5' 6\" (1.676 m)   Wt 187 lb (84.8 kg)   SpO2 100%   BMI 30.18 kg/m²     Physical Exam  Vitals reviewed. Constitutional:       Appearance: She is obese. HENT:      Mouth/Throat:      Dentition: Normal dentition. Cardiovascular:      Pulses:           Dorsalis pedis pulses are 2+ on the right side and 2+ on the left side.         Posterior tibial pulses are 2+ on the right side and 2+ on the left side. Pulmonary:      Effort: Pulmonary effort is normal.   Musculoskeletal:      Right lower leg: No edema. Left lower leg: No edema. Right foot: Decreased range of motion. Deformity and prominent metatarsal heads present. No bunion or Charcot foot. Left foot: Decreased range of motion. Deformity, Charcot foot and prominent metatarsal heads present. No bunion. Feet:      Right foot:      Protective Sensation: 10 sites tested. 0 sites sensed. Skin integrity: Skin integrity normal.      Toenail Condition: Right toenails are normal.      Left foot:      Protective Sensation: 10 sites tested. 0 sites sensed. Skin integrity: Skin integrity normal.      Toenail Condition: Left toenails are normal.   Skin:     General: Skin is warm. Capillary Refill: Capillary refill takes 2 to 3 seconds. Neurological:      Mental Status: She is alert and oriented to person, place, and time. Psychiatric:         Mood and Affect: Mood and affect normal.         Behavior: Behavior is cooperative. ASSESSMENT and PLAN    ICD-10-CM ICD-9-CM    1. Charcot arthropathy of midfoot  M14.679 094.0      713.5          Discussed and educated patient regarding her current medical condition  Plan discussed case with the physician who performed her second opinion (Dr. Shilpa Wright). He has agreed that she is not an optimal surgical candidate due to her comorbidities. He concurs that the most optimal treatment would be conservative with a CROW boot. As patient has previously failed therapy with an AFO for the same limb (and with the increasing severity of her deformity), I believe a CROW boot is warranted in this situation. The boot will be optimal in ulcer prevention and for symptomatic control. Patient verbalized understanding          Fantasma Madison.  PARAS Latham, CWSP, [facility] and [address] [place] [address]  O: [phone]  F: [phone]  C: [phone]

## 2021-10-13 ENCOUNTER — TELEPHONE (OUTPATIENT)
Dept: PODIATRY | Age: 71
End: 2021-10-13

## 2021-10-14 DIAGNOSIS — G62.9 NEUROPATHY: ICD-10-CM

## 2021-10-15 RX ORDER — PREGABALIN 50 MG/1
CAPSULE ORAL
Qty: 90 CAPSULE | Refills: 2 | Status: SHIPPED | OUTPATIENT
Start: 2021-10-15

## 2021-10-27 ENCOUNTER — TELEPHONE (OUTPATIENT)
Dept: ENT CLINIC | Age: 71
End: 2021-10-27

## 2021-10-27 NOTE — TELEPHONE ENCOUNTER
Pt called to check if her allergy serum has been mixed and is ready to be picked up    Please call pt

## 2021-11-11 ENCOUNTER — OFFICE VISIT (OUTPATIENT)
Dept: ENT CLINIC | Age: 71
End: 2021-11-11
Payer: MEDICARE

## 2021-11-11 DIAGNOSIS — J30.1 ALLERGIC RHINITIS DUE TO POLLEN, UNSPECIFIED SEASONALITY: Primary | ICD-10-CM

## 2021-11-11 PROCEDURE — 95117 IMMUNOTHERAPY INJECTIONS: CPT | Performed by: NURSE PRACTITIONER

## 2021-11-17 NOTE — PROGRESS NOTES
Needs progress note.
Vial Testing of vials 1 & 2 with no reaction. Vials and schedule given to the patient.
Alert and oriented, no focal deficits, no motor or sensory deficits.

## 2022-03-18 ENCOUNTER — TELEPHONE (OUTPATIENT)
Dept: ENT CLINIC | Age: 72
End: 2022-03-18

## 2022-03-18 PROBLEM — M77.42 METATARSALGIA OF BOTH FEET: Status: ACTIVE | Noted: 2020-08-11

## 2022-03-18 PROBLEM — M76.72 PERONEAL TENDONITIS OF LEFT LOWER EXTREMITY: Status: ACTIVE | Noted: 2020-11-17

## 2022-03-18 PROBLEM — H92.02 OTALGIA OF LEFT EAR: Status: ACTIVE | Noted: 2020-08-17

## 2022-03-18 PROBLEM — M77.41 METATARSALGIA OF BOTH FEET: Status: ACTIVE | Noted: 2020-08-11

## 2022-03-19 PROBLEM — K14.6 BURNING MOUTH SYNDROME: Status: ACTIVE | Noted: 2020-07-15

## 2022-03-19 PROBLEM — L50.9 URTICARIA: Status: ACTIVE | Noted: 2021-05-07

## 2022-03-19 PROBLEM — G62.9 NEUROPATHY: Status: ACTIVE | Noted: 2020-08-11

## 2022-03-19 PROBLEM — H92.09 OTALGIA: Status: ACTIVE | Noted: 2020-07-15

## 2022-03-19 PROBLEM — M20.42 HAMMERTOE OF LEFT FOOT: Status: ACTIVE | Noted: 2020-08-11

## 2022-03-20 PROBLEM — M76.62 TENDONITIS, ACHILLES, LEFT: Status: ACTIVE | Noted: 2020-11-17

## 2022-03-20 PROBLEM — J30.9 ALLERGIC RHINITIS: Status: ACTIVE | Noted: 2020-07-15

## 2022-03-25 ENCOUNTER — OFFICE VISIT (OUTPATIENT)
Dept: ENT CLINIC | Age: 72
End: 2022-03-25
Payer: MEDICARE

## 2022-03-25 VITALS — DIASTOLIC BLOOD PRESSURE: 72 MMHG | HEART RATE: 71 BPM | OXYGEN SATURATION: 98 % | SYSTOLIC BLOOD PRESSURE: 130 MMHG

## 2022-03-25 DIAGNOSIS — J30.1 ALLERGIC RHINITIS DUE TO POLLEN, UNSPECIFIED SEASONALITY: Primary | ICD-10-CM

## 2022-03-25 PROCEDURE — 95117 IMMUNOTHERAPY INJECTIONS: CPT | Performed by: NURSE PRACTITIONER

## 2022-03-25 NOTE — PROGRESS NOTES
Administered 0.02ml intradermal of vials 1 and 2 in [facility]. Local reaction 7mm wheal vial 1 and 9mm wheal vial 2.  Jud

## 2022-04-27 ENCOUNTER — OFFICE VISIT (OUTPATIENT)
Dept: ENT CLINIC | Age: 72
End: 2022-04-27
Payer: MEDICARE

## 2022-04-27 VITALS
SYSTOLIC BLOOD PRESSURE: 140 MMHG | RESPIRATION RATE: 17 BRPM | DIASTOLIC BLOOD PRESSURE: 76 MMHG | OXYGEN SATURATION: 98 % | BODY MASS INDEX: 30.05 KG/M2 | WEIGHT: 187 LBS | HEART RATE: 83 BPM | HEIGHT: 66 IN

## 2022-04-27 DIAGNOSIS — J34.89 NASAL VESTIBULITIS: ICD-10-CM

## 2022-04-27 DIAGNOSIS — H92.02 OTALGIA OF LEFT EAR: ICD-10-CM

## 2022-04-27 DIAGNOSIS — J30.1 ALLERGIC RHINITIS DUE TO POLLEN, UNSPECIFIED SEASONALITY: Primary | ICD-10-CM

## 2022-04-27 DIAGNOSIS — H69.83 ETD (EUSTACHIAN TUBE DYSFUNCTION), BILATERAL: ICD-10-CM

## 2022-04-27 PROCEDURE — G8427 DOCREV CUR MEDS BY ELIG CLIN: HCPCS | Performed by: OTOLARYNGOLOGY

## 2022-04-27 PROCEDURE — 1101F PT FALLS ASSESS-DOCD LE1/YR: CPT | Performed by: OTOLARYNGOLOGY

## 2022-04-27 PROCEDURE — G8536 NO DOC ELDER MAL SCRN: HCPCS | Performed by: OTOLARYNGOLOGY

## 2022-04-27 PROCEDURE — G8417 CALC BMI ABV UP PARAM F/U: HCPCS | Performed by: OTOLARYNGOLOGY

## 2022-04-27 PROCEDURE — G8753 SYS BP > OR = 140: HCPCS | Performed by: OTOLARYNGOLOGY

## 2022-04-27 PROCEDURE — G8754 DIAS BP LESS 90: HCPCS | Performed by: OTOLARYNGOLOGY

## 2022-04-27 PROCEDURE — 99214 OFFICE O/P EST MOD 30 MIN: CPT | Performed by: OTOLARYNGOLOGY

## 2022-04-27 PROCEDURE — 1090F PRES/ABSN URINE INCON ASSESS: CPT | Performed by: OTOLARYNGOLOGY

## 2022-04-27 PROCEDURE — G8432 DEP SCR NOT DOC, RNG: HCPCS | Performed by: OTOLARYNGOLOGY

## 2022-04-27 PROCEDURE — 3017F COLORECTAL CA SCREEN DOC REV: CPT | Performed by: OTOLARYNGOLOGY

## 2022-04-27 PROCEDURE — G8400 PT W/DXA NO RESULTS DOC: HCPCS | Performed by: OTOLARYNGOLOGY

## 2022-04-27 RX ORDER — SYRINGE WITH NEEDLE, 1 ML 28GX1/2"
1 SYRINGE, EMPTY DISPOSABLE MISCELLANEOUS EVERY 2 WEEKS
Qty: 1 EACH | Refills: 3 | Status: SHIPPED | OUTPATIENT
Start: 2022-04-27 | End: 2022-04-27 | Stop reason: SDUPTHER

## 2022-04-27 RX ORDER — MUPIROCIN 20 MG/G
OINTMENT TOPICAL
Qty: 22 G | Refills: 1 | Status: SHIPPED | OUTPATIENT
Start: 2022-04-27

## 2022-04-27 RX ORDER — SYRINGE WITH NEEDLE, 1 ML 28GX1/2"
1 SYRINGE, EMPTY DISPOSABLE MISCELLANEOUS EVERY 2 WEEKS
Qty: 1 EACH | Refills: 3 | Status: SHIPPED | OUTPATIENT
Start: 2022-04-27

## 2022-04-27 NOTE — PROGRESS NOTES
Subjective:    Ivett Gutierrez   71 y.o.   1950     Followup visit    Location - ear, nose    Quality - allergies, ear pain    Severity -  Mild/moderate    Duration - years    Timing - ongoing    Context - pt on IT, every other week now with concentrate, doing well nasal sore is better; having some left ear pain from her shoulder/neck pains    Modifying Features - allergies    Associated symptoms/signs -  Burning mouth    5/7/21 - 8 mos f/u pt is on biweekly IT overall doing well; still with occ left ear pain and soreness; she c/o occ itching under her skin on arms mostly    10/12/21 - 5 mos f/u - pt c/o \"not feeling well\" she is c/o left ear pain, more nasal congestion; not much drainage; went to urgent care got doxy and was told right ear perforation and fluid on left; side, not getting better    4/27/2022 -6-month follow-up; pt has overall been doing well on q2 weekly IT; no major flares of symptoms lately. She has been dealing with a sore around her left nostril. She needs more needles for injections. Review of Systems  Review of Systems   Constitutional: Negative for chills and fever. HENT: Positive for congestion, ear pain and tinnitus. Negative for hearing loss and nosebleeds. Eyes: Negative for blurred vision and double vision. Respiratory: Positive for shortness of breath. Negative for cough and sputum production. Cardiovascular: Negative for chest pain and palpitations. Gastrointestinal: Negative for heartburn, nausea and vomiting. Musculoskeletal: Positive for joint pain. Negative for neck pain. Skin: Positive for itching. Neurological: Positive for headaches. Negative for dizziness, speech change and weakness. Endo/Heme/Allergies: Negative for environmental allergies. Does not bruise/bleed easily. Psychiatric/Behavioral: Negative for memory loss. The patient does not have insomnia.           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  Otalgia 7/15/2020    Other and unspecified hyperlipidemia     Other chronic pulmonary heart diseases     Other pulmonary embolism and infarction     2007/08    Tremors of nervous system      Prior to Admission medications    Medication Sig Start Date End Date Taking? Authorizing Provider   Syringe with Needle, Disp, (Allergist Tray 1cc 27Gx3/8\") 1 mL 27 gauge x 3/8\" syrg 1 Syringe by Does Not Apply route Once every 2 weeks. 4/27/22  Yes Marco Souza MD   mupirocin (BACTROBAN) 2 % ointment Apply to nasal skin twice daily 4/27/22  Yes Marco Souza MD   pregabalin (LYRICA) 50 mg capsule Take one capsule by mouth THREE TIMES DAILY 10/15/21   Juliana Latham, PARAS   predniSONE (DELTASONE) 10 mg tablet 4 tab PO days 1-2   3 tab PO days 3-4     2 tab PO days 5-6      1 tab PO days 7-8 10/12/21   Marco Souza MD   hydrOXYzine HCL (ATARAX) 25 mg tablet TAKE ONE TABLET BY MOUTH THREE TIMES DAILY AS NEEDED FOR ITCHING 8/3/21   Marco Souza MD   betamethasone dipropionate (DIPROSONE) 0.05 % topical cream Apply  to affected area two (2) times a day. Provider, Historical   sucralfate (Carafate) 100 mg/mL suspension Take  by mouth four (4) times daily. Provider, Historical   carvediloL (COREG) 3.125 mg tablet Take  by mouth two (2) times daily (with meals). Provider, Historical   Cholestyramine-Aspartame (Cholestyramine Light) 4 gram powder Take  by mouth three (3) times daily (with meals).     Provider, Historical   lactulose (Constulose) 10 gram/15 mL solution Take  by mouth three (3) times daily. Provider, Historical   ivabradine (Corlanor) 5 mg tablet Take  by mouth two (2) times daily (with meals). Provider, Historical   dexlansoprazole (Dexilant) 60 mg CpDB capsule (delayed release) Take  by mouth. Provider, Historical   diclofenac (VOLTAREN) 1 % gel Apply  to affected area four (4) times daily. Provider, Historical   etanercept (EnbreL) 50 mg/mL (1 mL) injection by SubCUTAneous route. Provider, Historical   sacubitriL-valsartan Radha Cutter) 24-26 mg tablet Take 1 Tab by mouth two (2) times a day. Provider, Historical   famotidine (PEPCID) 20 mg tablet Take 20 mg by mouth two (2) times a day. Provider, Historical   furosemide (LASIX) 20 mg tablet Take  by mouth daily. Provider, Historical   hydrOXYchloroQUINE (PLAQUENIL) 200 mg tablet Take 200 mg by mouth daily. Provider, Historical   levothyroxine (SYNTHROID) 25 mcg tablet Take  by mouth Daily (before breakfast). Provider, Historical   meclizine (ANTIVERT) 25 mg tablet Take  by mouth three (3) times daily as needed for Dizziness. Provider, Historical   metoclopramide HCl (REGLAN) 5 mg tablet Take 5 mg by mouth Before breakfast, lunch, and dinner. Provider, Historical   montelukast (SINGULAIR) 10 mg tablet Take 10 mg by mouth daily. Provider, Historical   mirabegron ER (Myrbetriq) 50 mg ER tablet Take 50 mg by mouth daily. Provider, Historical   ondansetron hcl (ZOFRAN) 4 mg tablet Take 4 mg by mouth every eight (8) hours as needed for Nausea or Vomiting. Provider, Historical   silver sulfADIAZINE (SSD) 1 % topical cream Apply  to affected area daily. Provider, Historical   budesonide-formoteroL (Symbicort) 160-4.5 mcg/actuation HFAA Take 2 Puffs by inhalation. Provider, Historical   sucralfate (CARAFATE) 1 gram tablet Take 1 g by mouth four (4) times daily. Provider, Historical   traZODone (DESYREL) 50 mg tablet Take  by mouth nightly. Provider, Historical   triamcinolone acetonide (KENALOG) 0.1 % dental paste by Dental route two (2) times a day. Provider, Historical   triamcinolone acetonide (KENALOG) 0.1 % topical cream Apply  to affected area two (2) times a day. use thin layer    Provider, Historical   albuterol (Ventolin HFA) 90 mcg/actuation inhaler Take  by inhalation. Provider, Historical   rivaroxaban (Xarelto) 20 mg tab tablet Take  by mouth daily. Provider, Historical   lifitegrast (Xiidra) 5 % dpet Apply  to eye. Provider, Historical   DIOVAN 160 mg tablet TAKE ONE (1) TABLET, BY MOUTH, DAILY. 9/18/14   Eva Mcfarlane NP   cyclobenzaprine (FLEXERIL) 10 mg tablet Take  by mouth three (3) times daily as needed. 1/2 to 1 daily as needed    Provider, Historical   Hydrochlorothiazide 12.5 mg tablet Take 12.5 mg by mouth daily as needed. 4/25/13   Adriana Sandhu MD   escitalopram (LEXAPRO) 10 mg tablet Take 20 mg by mouth daily. Provider, Historical   primidone (MYSOLINE) 50 mg tablet Take 250 mg by mouth two (2) times a day. Provider, Historical   zolpidem (AMBIEN) 10 mg tablet Take  by mouth nightly as needed. 1/2 to 1 at bedtime as needed    Provider, Historical   celecoxib (CELEBREX) 200 mg capsule Take  by mouth two (2) times a day. Provider, Historical   simvastatin (ZOCOR) 40 mg tablet Take  by mouth nightly. Provider, Historical   lansoprazole (PREVACID) 30 mg capsule Take  by mouth Daily (before breakfast). Provider, Historical   CALCIUM CARB/VIT D3/MINERALS (CALCIUM-VITAMIN D PO) Take 50,000 mg by mouth every seven (7) days. Provider, Historical   aspirin 81 mg tablet Take 81 mg by mouth. Provider, Historical   allopurinol (ZYLOPRIM) 300 mg tablet Take 100 mg by mouth two (2) times a day.     Provider, Historical            Objective:     Visit Vitals  BP (!) 140/76 (BP 1 Location: Left upper arm, BP Patient Position: Sitting, BP Cuff Size: Adult)   Pulse 83   Resp 17   Ht 5' 6\" (1.676 m) Wt 187 lb (84.8 kg)   SpO2 98%   BMI 30.18 kg/m²        Physical Exam  Vitals reviewed. Constitutional:       General: She is awake. She is not in acute distress. Appearance: Normal appearance. She is well-groomed. She is obese. HENT:      Head: Normocephalic and atraumatic. Jaw: There is normal jaw occlusion. No trismus, tenderness or malocclusion. Salivary Glands: Right salivary gland is not diffusely enlarged or tender. Left salivary gland is not diffusely enlarged or tender. Right Ear: Hearing, tympanic membrane, ear canal and external ear normal. There is no impacted cerumen. Left Ear: Hearing, tympanic membrane, ear canal and external ear normal. There is no impacted cerumen. Ears:      Villalobos exam findings: does not lateralize. Right Rinne: AC > BC. Left Rinne: AC > BC. Nose: Septal deviation (Left inferior) present. No nasal deformity, laceration or mucosal edema. Right Nostril: No epistaxis. Left Nostril: No epistaxis. Right Turbinates: Not enlarged, swollen or pale. Left Turbinates: Not enlarged, swollen or pale. Right Sinus: No maxillary sinus tenderness or frontal sinus tenderness. Left Sinus: No maxillary sinus tenderness or frontal sinus tenderness. Comments: Crusted area of left nasal sill alar crease, Bactroban applied     Mouth/Throat:      Lips: Pink. No lesions. Mouth: Mucous membranes are moist. No oral lesions. Dentition: Normal dentition. No dental caries. Tongue: No lesions. Palate: No mass and lesions. Pharynx: Oropharynx is clear. Uvula midline. No oropharyngeal exudate or posterior oropharyngeal erythema. Tonsils: No tonsillar exudate. 0 on the right. 0 on the left. Eyes:      General: Vision grossly intact. Extraocular Movements: Extraocular movements intact. Right eye: No nystagmus. Left eye: No nystagmus.       Conjunctiva/sclera: Conjunctivae normal. Pupils: Pupils are equal, round, and reactive to light. Neck:      Thyroid: No thyroid mass, thyromegaly or thyroid tenderness. Trachea: Trachea and phonation normal. No tracheal tenderness or tracheal deviation. Cardiovascular:      Rate and Rhythm: Normal rate and regular rhythm. Pulmonary:      Effort: Pulmonary effort is normal. No respiratory distress. Breath sounds: No stridor. Musculoskeletal:         General: No swelling or tenderness. Normal range of motion. Cervical back: No edema or erythema. Lymphadenopathy:      Cervical: No cervical adenopathy. Skin:     General: Skin is warm and dry. Findings: No lesion or rash. Neurological:      General: No focal deficit present. Mental Status: She is alert and oriented to person, place, and time. Mental status is at baseline. Cranial Nerves: Cranial nerves are intact. Coordination: Romberg sign negative. Gait: Gait is intact. Psychiatric:         Mood and Affect: Mood normal.         Behavior: Behavior normal. Behavior is cooperative. Assessment/Plan:     Encounter Diagnoses   Name Primary?  Allergic rhinitis due to pollen, unspecified seasonality Yes    Otalgia of left ear     ETD (Eustachian tube dysfunction), bilateral     Nasal vestibulitis      Is overall doing well on IT, cont biweekly. If she wants to do weekly shots during allergy season this is fine. Otalgia is referred or ETD related. More chronic. Ear exam clear today    Prednisone taper only if symptoms become worse. Bactroban to her nasal sore. Fu 6 mo    Orders Placed This Encounter    DISCONTD: Syringe with Needle, Disp, (Allergist Tray 1cc 27Gx3/8\") 1 mL 27 gauge x 3/8\" syrg    Syringe with Needle, Disp, (Allergist Tray 1cc 27Gx3/8\") 1 mL 27 gauge x 3/8\" syrg    mupirocin (BACTROBAN) 2 % ointment     Follow-up and Dispositions    · Return in about 6 months (around 10/27/2022).

## 2022-04-28 ENCOUNTER — TELEPHONE (OUTPATIENT)
Dept: ENT CLINIC | Age: 72
End: 2022-04-28

## 2022-04-28 NOTE — TELEPHONE ENCOUNTER
Carina with [employer] called stating they are out of the 27g syringes and wanted to know if it is ok to substitute with 28g?      Please contact and advise: [phone]

## 2022-04-28 NOTE — TELEPHONE ENCOUNTER
Spoke with Tessa Jain at [facility], she was able to locate 27G needles, 3/8 inch which I accepted. She has message out to patient because her insurance will not cover.  Jud

## 2022-05-20 ENCOUNTER — TELEPHONE (OUTPATIENT)
Dept: ENT CLINIC | Age: 72
End: 2022-05-20

## 2022-05-20 NOTE — TELEPHONE ENCOUNTER
Contacted pt to r/s appt scheduled 10/24, pt answered and stated she will have to call back because she was at another dr appt.

## 2022-05-23 ENCOUNTER — TELEPHONE (OUTPATIENT)
Dept: ENT CLINIC | Age: 72
End: 2022-05-23

## 2022-05-23 NOTE — TELEPHONE ENCOUNTER
Pt called and lvm in regards to r/s her appt and inquiring about a medication. I called the pt back and r/s her appt. She stated that she would like a medication for her allergies and then she also stated that she is itching a lot, like an under the skin itch but there is no rash. She was wondering if Dr. Dinorah Tomas would be able to give her the allergy medication as well as something for the itching feeling. Please advise.

## 2022-05-24 RX ORDER — DOXYCYCLINE 100 MG/1
100 CAPSULE ORAL 2 TIMES DAILY
Qty: 20 CAPSULE | Refills: 0 | Status: SHIPPED | OUTPATIENT
Start: 2022-05-24 | End: 2022-06-03

## 2022-05-24 RX ORDER — METHYLPREDNISOLONE 4 MG/1
TABLET ORAL
Qty: 1 DOSE PACK | Refills: 0 | Status: SHIPPED | OUTPATIENT
Start: 2022-05-24

## 2022-05-24 RX ORDER — HYDROXYZINE 25 MG/1
TABLET, FILM COATED ORAL
Qty: 60 TABLET | Refills: 3 | Status: SHIPPED | OUTPATIENT
Start: 2022-05-24

## 2022-07-20 ENCOUNTER — TELEPHONE (OUTPATIENT)
Dept: ENT CLINIC | Age: 72
End: 2022-07-20

## 2022-07-21 ENCOUNTER — TELEPHONE (OUTPATIENT)
Dept: ENT CLINIC | Age: 72
End: 2022-07-21

## 2022-08-04 ENCOUNTER — OFFICE VISIT (OUTPATIENT)
Dept: ENT CLINIC | Age: 72
End: 2022-08-04
Payer: MEDICARE

## 2022-08-04 VITALS — OXYGEN SATURATION: 100 % | DIASTOLIC BLOOD PRESSURE: 60 MMHG | HEART RATE: 60 BPM | SYSTOLIC BLOOD PRESSURE: 108 MMHG

## 2022-08-04 DIAGNOSIS — J30.1 ALLERGIC RHINITIS DUE TO POLLEN, UNSPECIFIED SEASONALITY: Primary | ICD-10-CM

## 2022-08-04 PROCEDURE — 95117 IMMUNOTHERAPY INJECTIONS: CPT | Performed by: OTOLARYNGOLOGY

## 2022-08-04 NOTE — PROGRESS NOTES
Administered vial test 0.02ml intradermal of vials 1 and 2 in [facility]. Local reaction 9mm wheal vial 1 and 7mm wheal vial 2.  [email or web address]

## 2022-10-13 ENCOUNTER — TELEPHONE (OUTPATIENT)
Dept: ENT CLINIC | Age: 72
End: 2022-10-13

## 2022-10-13 NOTE — TELEPHONE ENCOUNTER
Attempted to reach Daiana Ibanez to let them know that their appointment scheduled on 10/26 needs to be rescheduled due to Dr. Pravin Spear being in surgery and left a voicemail stating the appointment has been cancelled and asked that the patient to call back to reschedule.

## 2022-12-14 ENCOUNTER — OFFICE VISIT (OUTPATIENT)
Dept: ENT CLINIC | Age: 72
End: 2022-12-14
Payer: MEDICARE

## 2022-12-14 VITALS — HEART RATE: 69 BPM | OXYGEN SATURATION: 98 % | SYSTOLIC BLOOD PRESSURE: 102 MMHG | DIASTOLIC BLOOD PRESSURE: 60 MMHG

## 2022-12-14 DIAGNOSIS — J30.1 ALLERGIC RHINITIS DUE TO POLLEN, UNSPECIFIED SEASONALITY: Primary | ICD-10-CM

## 2022-12-14 PROCEDURE — 95117 IMMUNOTHERAPY INJECTIONS: CPT | Performed by: OTOLARYNGOLOGY

## 2022-12-14 NOTE — PROGRESS NOTES
Administered vial test 0.02ml intradermal of vials 1 and 2 in [facility]. Local reactions 7mm wheal vial 1 and 9mm wheal vial 2.  JocelynN

## 2023-01-11 ENCOUNTER — OFFICE VISIT (OUTPATIENT)
Dept: ENT CLINIC | Age: 73
End: 2023-01-11
Payer: MEDICARE

## 2023-01-11 VITALS
WEIGHT: 210 LBS | HEIGHT: 66 IN | HEART RATE: 76 BPM | BODY MASS INDEX: 33.75 KG/M2 | OXYGEN SATURATION: 96 % | SYSTOLIC BLOOD PRESSURE: 142 MMHG | DIASTOLIC BLOOD PRESSURE: 88 MMHG | RESPIRATION RATE: 17 BRPM

## 2023-01-11 DIAGNOSIS — H61.22 IMPACTED CERUMEN OF LEFT EAR: ICD-10-CM

## 2023-01-11 DIAGNOSIS — J30.1 ALLERGIC RHINITIS DUE TO POLLEN, UNSPECIFIED SEASONALITY: Primary | ICD-10-CM

## 2023-01-11 DIAGNOSIS — J01.91 ACUTE RECURRENT SINUSITIS, UNSPECIFIED LOCATION: ICD-10-CM

## 2023-01-11 DIAGNOSIS — H69.83 ETD (EUSTACHIAN TUBE DYSFUNCTION), BILATERAL: ICD-10-CM

## 2023-01-11 PROCEDURE — 3079F DIAST BP 80-89 MM HG: CPT | Performed by: OTOLARYNGOLOGY

## 2023-01-11 PROCEDURE — 1123F ACP DISCUSS/DSCN MKR DOCD: CPT | Performed by: OTOLARYNGOLOGY

## 2023-01-11 PROCEDURE — 1101F PT FALLS ASSESS-DOCD LE1/YR: CPT | Performed by: OTOLARYNGOLOGY

## 2023-01-11 PROCEDURE — G8510 SCR DEP NEG, NO PLAN REQD: HCPCS | Performed by: OTOLARYNGOLOGY

## 2023-01-11 PROCEDURE — 3077F SYST BP >= 140 MM HG: CPT | Performed by: OTOLARYNGOLOGY

## 2023-01-11 PROCEDURE — G8536 NO DOC ELDER MAL SCRN: HCPCS | Performed by: OTOLARYNGOLOGY

## 2023-01-11 PROCEDURE — 3017F COLORECTAL CA SCREEN DOC REV: CPT | Performed by: OTOLARYNGOLOGY

## 2023-01-11 PROCEDURE — 99213 OFFICE O/P EST LOW 20 MIN: CPT | Performed by: OTOLARYNGOLOGY

## 2023-01-11 PROCEDURE — G8417 CALC BMI ABV UP PARAM F/U: HCPCS | Performed by: OTOLARYNGOLOGY

## 2023-01-11 PROCEDURE — G8427 DOCREV CUR MEDS BY ELIG CLIN: HCPCS | Performed by: OTOLARYNGOLOGY

## 2023-01-11 PROCEDURE — 1090F PRES/ABSN URINE INCON ASSESS: CPT | Performed by: OTOLARYNGOLOGY

## 2023-01-11 PROCEDURE — G8400 PT W/DXA NO RESULTS DOC: HCPCS | Performed by: OTOLARYNGOLOGY

## 2023-01-11 PROCEDURE — 69210 REMOVE IMPACTED EAR WAX UNI: CPT | Performed by: OTOLARYNGOLOGY

## 2023-01-11 RX ORDER — DOXYCYCLINE 100 MG/1
100 CAPSULE ORAL 2 TIMES DAILY
Qty: 28 CAPSULE | Refills: 0 | Status: SHIPPED | OUTPATIENT
Start: 2023-01-11 | End: 2023-01-25

## 2023-01-11 RX ORDER — METHYLPREDNISOLONE 4 MG/1
TABLET ORAL
Qty: 1 DOSE PACK | Refills: 0 | Status: SHIPPED | OUTPATIENT
Start: 2023-01-11

## 2023-01-11 NOTE — PROGRESS NOTES
1. \"Have you been to the ER, urgent care clinic since your last visit? Hospitalized since your last visit? \" No    2. \"Have you seen or consulted any other health care providers outside of the [address] [place] [address] since your last visit? \" No     3. For patients aged 39-70: Has the patient had a colonoscopy / FIT/ Cologuard? No      If the patient is female:    4. For patients aged 41-77: Has the patient had a mammogram within the past 2 years? NA - based on age or sex      11. For patients aged 21-65: Has the patient had a pap smear?  NA - based on age or sex      Chief Complaint   Patient presents with    Sinus Infection    Sinus Pain        Visit Vitals  BP (!) 142/88 (BP 1 Location: Left upper arm, BP Patient Position: Sitting, BP Cuff Size: Adult)   Pulse 76   Resp 17   Ht 5' 6\" (1.676 m)   Wt 210 lb (95.3 kg)   SpO2 96%   BMI 33.89 kg/m²

## 2023-01-11 NOTE — PROGRESS NOTES
Subjective:    Ed Do   71 y.o.   1950     Followup visit    Location - ear, nose    Quality - allergies, ear pain    Severity -  Mild/moderate    Duration - years    Timing - ongoing    Context - pt on IT, every other week now with concentrate, doing well nasal sore is better; having some left ear pain from her shoulder/neck pains    Modifying Features - allergies    Associated symptoms/signs -  Burning mouth    5/7/21 - 8 mos f/u pt is on biweekly IT overall doing well; still with occ left ear pain and soreness; she c/o occ itching under her skin on arms mostly    10/12/21 - 5 mos f/u - pt c/o \"not feeling well\" she is c/o left ear pain, more nasal congestion; not much drainage; went to urgent care got doxy and was told right ear perforation and fluid on left; side, not getting better    4/27/2022 -6-month follow-up; pt has overall been doing well on q2 weekly IT; no major flares of symptoms lately. She has been dealing with a sore around her left nostril. She needs more needles for injections. 1/11/23  1 week of sinus pressure PND, yellow drainage. OTCs not helping. On IT every 2 weeks still    Review of Systems  Review of Systems   Constitutional:  Negative for chills and fever. HENT:  Positive for congestion, ear pain and tinnitus. Negative for hearing loss and nosebleeds. Eyes:  Negative for blurred vision and double vision. Respiratory:  Positive for shortness of breath. Negative for cough and sputum production. Cardiovascular:  Negative for chest pain and palpitations. Gastrointestinal:  Negative for heartburn, nausea and vomiting. Musculoskeletal:  Positive for joint pain. Negative for neck pain. Skin:  Positive for itching. Neurological:  Positive for headaches. Negative for dizziness, speech change and weakness. Endo/Heme/Allergies:  Negative for environmental allergies. Does not bruise/bleed easily. Psychiatric/Behavioral:  Negative for memory loss.  The patient does not have insomnia. 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 Otalgia 7/15/2020    Other and unspecified hyperlipidemia     Other chronic pulmonary heart diseases     Other pulmonary embolism and infarction     2007/08    Tremors of nervous system      Prior to Admission medications    Medication Sig Start Date End Date Taking? Authorizing Provider   doxycycline (VIBRAMYCIN) 100 mg capsule Take 1 Capsule by mouth two (2) times a day for 14 days. 1/11/23 1/25/23 Yes Michelle Stover MD   methylPREDNISolone (MEDROL DOSEPACK) 4 mg tablet Take as directed on package 1/11/23  Yes Chantal Joe MD   hydrOXYzine HCL (ATARAX) 25 mg tablet TAKE ONE TABLET BY MOUTH THREE TIMES DAILY AS NEEDED FOR ITCHING 5/24/22  Yes Chantal Joe MD   Syringe with Needle, Disp, (Allergist Tray 1cc 27Gx3/8\") 1 mL 27 gauge x 3/8\" syrg 1 Syringe by Does Not Apply route Once every 2 weeks. 4/27/22  Yes Chantal Joe MD   mupirocin (BACTROBAN) 2 % ointment Apply to nasal skin twice daily 4/27/22  Yes Chantal Joe MD   pregabalin (LYRICA) 50 mg capsule Take one capsule by mouth THREE TIMES DAILY 10/15/21  Yes Pk Latham DPM   predniSONE (DELTASONE) 10 mg tablet 4 tab PO days 1-2   3 tab PO days 3-4     2 tab PO days 5-6      1 tab PO days 7-8 10/12/21  Yes Lázaro Stover MD   betamethasone dipropionate (DIPROSONE) 0.05 % topical cream Apply  to affected area two (2) times a day.    Yes Provider, Historical   sucralfate (CARAFATE) 100 mg/mL suspension Take  by mouth four (4) times daily. Yes Provider, Historical   carvediloL (COREG) 3.125 mg tablet Take  by mouth two (2) times daily (with meals). Yes Provider, Historical   Cholestyramine-Aspartame (Cholestyramine Light) 4 gram powder Take  by mouth three (3) times daily (with meals). Yes Provider, Historical   lactulose (CHRONULAC) 10 gram/15 mL solution Take  by mouth three (3) times daily. Yes Provider, Historical   ivabradine (CORLANOR) 5 mg tablet Take  by mouth two (2) times daily (with meals). Yes Provider, Historical   dexlansoprazole (Dexilant) 60 mg CpDB capsule (delayed release) Take  by mouth. Yes Provider, Historical   diclofenac (VOLTAREN) 1 % gel Apply  to affected area four (4) times daily. Yes Provider, Historical   etanercept (EnbreL) 50 mg/mL (1 mL) injection by SubCUTAneous route. Yes Provider, Historical   sacubitriL-valsartan (Entresto) 24-26 mg tablet Take 1 Tab by mouth two (2) times a day. Yes Provider, Historical   famotidine (PEPCID) 20 mg tablet Take 20 mg by mouth two (2) times a day. Yes Provider, Historical   furosemide (LASIX) 20 mg tablet Take  by mouth daily. Yes Provider, Historical   hydrOXYchloroQUINE (PLAQUENIL) 200 mg tablet Take 200 mg by mouth daily. Yes Provider, Historical   levothyroxine (SYNTHROID) 25 mcg tablet Take  by mouth Daily (before breakfast). Yes Provider, Historical   meclizine (ANTIVERT) 25 mg tablet Take  by mouth three (3) times daily as needed for Dizziness. Yes Provider, Historical   metoclopramide HCl (REGLAN) 5 mg tablet Take 5 mg by mouth Before breakfast, lunch, and dinner. Yes Provider, Historical   montelukast (SINGULAIR) 10 mg tablet Take 10 mg by mouth daily. Yes Provider, Historical   mirabegron ER (MYRBETRIQ) 50 mg ER tablet Take 50 mg by mouth daily. Yes Provider, Historical   ondansetron hcl (ZOFRAN) 4 mg tablet Take 4 mg by mouth every eight (8) hours as needed for Nausea or Vomiting.    Yes Provider, Historical   silver sulfADIAZINE (SILVADENE) 1 % topical cream Apply  to affected area daily. Yes Provider, Historical   budesonide-formoteroL (SYMBICORT) 160-4.5 mcg/actuation HFAA Take 2 Puffs by inhalation. Yes Provider, Historical   sucralfate (CARAFATE) 1 gram tablet Take 1 g by mouth four (4) times daily. Yes Provider, Historical   triamcinolone acetonide (KENALOG) 0.1 % topical cream Apply  to affected area two (2) times a day. use thin layer   Yes Provider, Historical   albuterol (PROVENTIL HFA, VENTOLIN HFA, PROAIR HFA) 90 mcg/actuation inhaler Take  by inhalation. Yes Provider, Historical   rivaroxaban (XARELTO) 20 mg tab tablet Take  by mouth daily. Yes Provider, Historical   lifitegrast (Xiidra) 5 % dpet Apply  to eye. Yes Provider, Historical   DIOVAN 160 mg tablet TAKE ONE (1) TABLET, BY MOUTH, DAILY. 9/18/14  Yes Eva Mcfarlane NP   cyclobenzaprine (FLEXERIL) 10 mg tablet Take  by mouth three (3) times daily as needed. 1/2 to 1 daily as needed   Yes Provider, Historical   Hydrochlorothiazide 12.5 mg tablet Take 12.5 mg by mouth daily as needed. 4/25/13  Yes Curtis Duvall MD   escitalopram oxalate (LEXAPRO) 10 mg tablet Take 20 mg by mouth daily. Yes Provider, Historical   primidone (MYSOLINE) 50 mg tablet Take 250 mg by mouth two (2) times a day. Yes Provider, Historical   zolpidem (AMBIEN) 10 mg tablet Take  by mouth nightly as needed. 1/2 to 1 at bedtime as needed   Yes Provider, Historical   celecoxib (CELEBREX) 200 mg capsule Take  by mouth two (2) times a day. Yes Provider, Historical   simvastatin (ZOCOR) 40 mg tablet Take  by mouth nightly. Yes Provider, Historical   lansoprazole (PREVACID) 30 mg capsule Take  by mouth Daily (before breakfast). Yes Provider, Historical   CALCIUM CARB/VIT D3/MINERALS (CALCIUM-VITAMIN D PO) Take 50,000 mg by mouth every seven (7) days. Yes Provider, Historical   aspirin 81 mg tablet Take 81 mg by mouth.    Yes Provider, Historical   allopurinol (ZYLOPRIM) 300 mg tablet Take 100 mg by mouth two (2) times a day. Yes Provider, Historical   traZODone (DESYREL) 50 mg tablet Take  by mouth nightly. Provider, Historical   triamcinolone acetonide (KENALOG) 0.1 % dental paste by Dental route two (2) times a day. Provider, Historical            Objective:     Visit Vitals  BP (!) 142/88 (BP 1 Location: Left upper arm, BP Patient Position: Sitting, BP Cuff Size: Adult)   Pulse 76   Resp 17   Ht 5' 6\" (1.676 m)   Wt 210 lb (95.3 kg)   SpO2 96%   BMI 33.89 kg/m²        Physical Exam  Vitals reviewed. Constitutional:       General: She is awake. She is not in acute distress. Appearance: Normal appearance. She is well-groomed. She is obese. HENT:      Head: Normocephalic and atraumatic. Jaw: There is normal jaw occlusion. No trismus, tenderness or malocclusion. Salivary Glands: Right salivary gland is not diffusely enlarged or tender. Left salivary gland is not diffusely enlarged or tender. Right Ear: Hearing, tympanic membrane, ear canal and external ear normal. There is no impacted cerumen. Left Ear: Hearing, tympanic membrane, ear canal and external ear normal. There is no impacted cerumen. Ears:      Villalobos exam findings: Does not lateralize. Right Rinne: AC > BC. Left Rinne: AC > BC. Nose: Septal deviation (Left inferior) and nasal tenderness present. No nasal deformity, laceration or mucosal edema. Right Nostril: No epistaxis. Left Nostril: No epistaxis. Right Turbinates: Not enlarged, swollen or pale. Left Turbinates: Not enlarged, swollen or pale. Right Sinus: No maxillary sinus tenderness or frontal sinus tenderness. Left Sinus: No maxillary sinus tenderness or frontal sinus tenderness. Mouth/Throat:      Lips: Pink. No lesions. Mouth: Mucous membranes are moist. No oral lesions. Dentition: Normal dentition. No dental caries.       Tongue: No lesions. Palate: No mass and lesions. Pharynx: Oropharynx is clear. Uvula midline. No oropharyngeal exudate or posterior oropharyngeal erythema. Tonsils: No tonsillar exudate. 0 on the right. 0 on the left. Eyes:      General: Vision grossly intact. Extraocular Movements: Extraocular movements intact. Right eye: No nystagmus. Left eye: No nystagmus. Conjunctiva/sclera: Conjunctivae normal.      Pupils: Pupils are equal, round, and reactive to light. Neck:      Thyroid: No thyroid mass, thyromegaly or thyroid tenderness. Trachea: Trachea and phonation normal. No tracheal tenderness or tracheal deviation. Cardiovascular:      Rate and Rhythm: Normal rate and regular rhythm. Pulmonary:      Effort: Pulmonary effort is normal. No respiratory distress. Breath sounds: No stridor. Musculoskeletal:         General: No swelling or tenderness. Normal range of motion. Cervical back: No edema or erythema. Lymphadenopathy:      Cervical: No cervical adenopathy. Skin:     General: Skin is warm and dry. Findings: No lesion or rash. Neurological:      General: No focal deficit present. Mental Status: She is alert and oriented to person, place, and time. Mental status is at baseline. Coordination: Romberg sign negative. Gait: Gait is intact. Psychiatric:         Mood and Affect: Mood normal.         Behavior: Behavior normal. Behavior is cooperative. Procedure - Removal Impacted Cerumen    Indications: cerumen impaction    Ears are examined under microscope/headlight. left ears are cleaned using otologic curette, suction, and/or alligator forceps. Assessment/Plan:     Encounter Diagnoses   Name Primary?     Allergic rhinitis due to pollen, unspecified seasonality Yes    ETD (Eustachian tube dysfunction), bilateral     Acute recurrent sinusitis, unspecified location     Impacted cerumen of left ear      Is overall doing well on IT, cont biweekly. If she wants to do weekly shots during allergy season this is fine. Left ear cleared today.     Rx for acute sinus today    Fu 1 mo    Orders Placed This Encounter    doxycycline (VIBRAMYCIN) 100 mg capsule    methylPREDNISolone (MEDROL DOSEPACK) 4 mg tablet

## 2023-03-22 ENCOUNTER — OFFICE VISIT (OUTPATIENT)
Dept: ENT CLINIC | Age: 73
End: 2023-03-22
Payer: MEDICARE

## 2023-03-22 VITALS
DIASTOLIC BLOOD PRESSURE: 76 MMHG | HEART RATE: 84 BPM | WEIGHT: 210 LBS | OXYGEN SATURATION: 99 % | SYSTOLIC BLOOD PRESSURE: 140 MMHG | HEIGHT: 66 IN | BODY MASS INDEX: 33.75 KG/M2 | RESPIRATION RATE: 17 BRPM

## 2023-03-22 DIAGNOSIS — H69.83 ETD (EUSTACHIAN TUBE DYSFUNCTION), BILATERAL: ICD-10-CM

## 2023-03-22 DIAGNOSIS — H92.02 OTALGIA OF LEFT EAR: ICD-10-CM

## 2023-03-22 DIAGNOSIS — J32.0 CHRONIC MAXILLARY SINUSITIS: ICD-10-CM

## 2023-03-22 DIAGNOSIS — J30.1 ALLERGIC RHINITIS DUE TO POLLEN, UNSPECIFIED SEASONALITY: Primary | ICD-10-CM

## 2023-03-22 PROCEDURE — 1123F ACP DISCUSS/DSCN MKR DOCD: CPT | Performed by: OTOLARYNGOLOGY

## 2023-03-22 PROCEDURE — 99213 OFFICE O/P EST LOW 20 MIN: CPT | Performed by: OTOLARYNGOLOGY

## 2023-03-22 PROCEDURE — G8417 CALC BMI ABV UP PARAM F/U: HCPCS | Performed by: OTOLARYNGOLOGY

## 2023-03-22 PROCEDURE — 3077F SYST BP >= 140 MM HG: CPT | Performed by: OTOLARYNGOLOGY

## 2023-03-22 PROCEDURE — G8536 NO DOC ELDER MAL SCRN: HCPCS | Performed by: OTOLARYNGOLOGY

## 2023-03-22 PROCEDURE — 1101F PT FALLS ASSESS-DOCD LE1/YR: CPT | Performed by: OTOLARYNGOLOGY

## 2023-03-22 PROCEDURE — G8510 SCR DEP NEG, NO PLAN REQD: HCPCS | Performed by: OTOLARYNGOLOGY

## 2023-03-22 PROCEDURE — 3017F COLORECTAL CA SCREEN DOC REV: CPT | Performed by: OTOLARYNGOLOGY

## 2023-03-22 PROCEDURE — 3078F DIAST BP <80 MM HG: CPT | Performed by: OTOLARYNGOLOGY

## 2023-03-22 PROCEDURE — G8427 DOCREV CUR MEDS BY ELIG CLIN: HCPCS | Performed by: OTOLARYNGOLOGY

## 2023-03-22 PROCEDURE — G8400 PT W/DXA NO RESULTS DOC: HCPCS | Performed by: OTOLARYNGOLOGY

## 2023-03-22 PROCEDURE — 1090F PRES/ABSN URINE INCON ASSESS: CPT | Performed by: OTOLARYNGOLOGY

## 2023-03-22 NOTE — LETTER
3/22/2023    Patient: Courtney Aguiar   YOB: 1950   Date of Visit: 3/22/2023     Belia Sinha MD  [place] [phone]  Via Fax: [phone]    Dear Belia Sinha MD,      Thank you for referring Ms. Zandra Seo to [facility] EAR [facility]SE [facility] [address], [facility] for evaluation. My notes for this consultation are attached. If you have questions, please do not hesitate to call me. I look forward to following your patient along with you.       Sincerely,    Siobhan Joya MD

## 2023-04-17 ENCOUNTER — OFFICE VISIT (OUTPATIENT)
Dept: ENT CLINIC | Age: 73
End: 2023-04-17
Payer: MEDICARE

## 2023-04-17 VITALS — OXYGEN SATURATION: 97 % | DIASTOLIC BLOOD PRESSURE: 64 MMHG | SYSTOLIC BLOOD PRESSURE: 110 MMHG | HEART RATE: 76 BPM

## 2023-04-17 DIAGNOSIS — J30.1 ALLERGIC RHINITIS DUE TO POLLEN, UNSPECIFIED SEASONALITY: Primary | ICD-10-CM

## 2023-04-17 PROCEDURE — 95117 IMMUNOTHERAPY INJECTIONS: CPT | Performed by: NURSE PRACTITIONER

## 2023-04-17 NOTE — PROGRESS NOTES
Administered vial test 0.02ml intradermal of vials 1 and 2 in GORDON. Local reactions 9mm wheal at both injection sites.  Jud

## 2023-04-17 NOTE — PROGRESS NOTES
I have reviewed the notes, assessments, and/or procedures performed by Viktor Guadalupe RN, I concur with her/his documentation of Laron Isbell.

## 2023-08-21 DIAGNOSIS — L29.9 ITCHING: Primary | ICD-10-CM

## 2023-08-21 RX ORDER — HYDROXYZINE HYDROCHLORIDE 25 MG/1
TABLET, FILM COATED ORAL
Qty: 90 TABLET | Refills: 2 | Status: SHIPPED | OUTPATIENT
Start: 2023-08-21

## 2023-08-22 ENCOUNTER — TELEPHONE (OUTPATIENT)
Age: 73
End: 2023-08-22

## 2023-08-22 NOTE — TELEPHONE ENCOUNTER
Pt stated that the medication Dr. Shirin Briceño prescribed her for itching could not be filled by the pharmacy since it interferes with her other medications and would like another medication to be sent in

## 2023-09-20 ENCOUNTER — TELEPHONE (OUTPATIENT)
Age: 73
End: 2023-09-20

## 2023-09-28 ENCOUNTER — NURSE ONLY (OUTPATIENT)
Age: 73
End: 2023-09-28
Payer: MEDICARE

## 2023-09-28 VITALS — SYSTOLIC BLOOD PRESSURE: 104 MMHG | DIASTOLIC BLOOD PRESSURE: 60 MMHG

## 2023-09-28 DIAGNOSIS — J30.89 ALLERGIC RHINITIS DUE TO OTHER ALLERGIC TRIGGER, UNSPECIFIED SEASONALITY: Primary | ICD-10-CM

## 2023-09-28 PROCEDURE — 95117 IMMUNOTHERAPY INJECTIONS: CPT | Performed by: NURSE PRACTITIONER

## 2023-10-04 ENCOUNTER — TELEPHONE (OUTPATIENT)
Age: 73
End: 2023-10-04

## 2023-10-04 NOTE — TELEPHONE ENCOUNTER
Lvm for pt to contact our office to reschedule appt with Dr. Triston Castaneda on 10/24/23 due to the provider not being in the office. Per param/Dr. Triston Castaneda pt can be rescheduled to afternoon of 10/20/23 if spots available.

## 2024-03-13 ENCOUNTER — NURSE ONLY (OUTPATIENT)
Age: 74
End: 2024-03-13
Payer: MEDICARE

## 2024-03-13 VITALS — SYSTOLIC BLOOD PRESSURE: 108 MMHG | DIASTOLIC BLOOD PRESSURE: 68 MMHG | OXYGEN SATURATION: 99 % | HEART RATE: 76 BPM

## 2024-03-13 DIAGNOSIS — J30.89 ALLERGIC RHINITIS DUE TO OTHER ALLERGIC TRIGGER, UNSPECIFIED SEASONALITY: Primary | ICD-10-CM

## 2024-03-13 PROCEDURE — 95117 IMMUNOTHERAPY INJECTIONS: CPT | Performed by: STUDENT IN AN ORGANIZED HEALTH CARE EDUCATION/TRAINING PROGRAM

## 2024-06-19 ENCOUNTER — TELEPHONE (OUTPATIENT)
Age: 74
End: 2024-06-19

## 2024-06-19 DIAGNOSIS — L50.9 URTICARIA: Primary | ICD-10-CM

## 2024-06-19 NOTE — TELEPHONE ENCOUNTER
Pt came in the office and asked can she get a prescription called in for her itching under the skin. She stated that its not a rash, but it itches really bad.

## 2024-06-20 RX ORDER — HYDROXYZINE HYDROCHLORIDE 25 MG/1
25 TABLET, FILM COATED ORAL EVERY 8 HOURS PRN
Qty: 30 TABLET | Refills: 1 | Status: SHIPPED | OUTPATIENT
Start: 2024-06-20 | End: 2024-06-30

## 2024-08-14 ENCOUNTER — TELEPHONE (OUTPATIENT)
Age: 74
End: 2024-08-14

## 2024-08-14 NOTE — TELEPHONE ENCOUNTER
[facility] returning call from patient. Allergy serum is not ready yet but I will call as soon as it is mixed. Ana

## 2024-08-19 ENCOUNTER — TELEPHONE (OUTPATIENT)
Age: 74
End: 2024-08-19

## 2024-08-28 ENCOUNTER — NURSE ONLY (OUTPATIENT)
Age: 74
End: 2024-08-28
Payer: MEDICARE

## 2024-08-28 VITALS — OXYGEN SATURATION: 96 % | SYSTOLIC BLOOD PRESSURE: 100 MMHG | DIASTOLIC BLOOD PRESSURE: 54 MMHG | HEART RATE: 64 BPM

## 2024-08-28 DIAGNOSIS — J30.89 ALLERGIC RHINITIS DUE TO OTHER ALLERGIC TRIGGER, UNSPECIFIED SEASONALITY: Primary | ICD-10-CM

## 2024-08-28 PROCEDURE — 95117 IMMUNOTHERAPY INJECTIONS: CPT | Performed by: OTOLARYNGOLOGY

## 2024-10-23 ENCOUNTER — OFFICE VISIT (OUTPATIENT)
Age: 74
End: 2024-10-23
Payer: MEDICARE

## 2024-10-23 VITALS
HEART RATE: 74 BPM | DIASTOLIC BLOOD PRESSURE: 70 MMHG | RESPIRATION RATE: 15 BRPM | OXYGEN SATURATION: 98 % | BODY MASS INDEX: 33.89 KG/M2 | HEIGHT: 66 IN | SYSTOLIC BLOOD PRESSURE: 110 MMHG

## 2024-10-23 DIAGNOSIS — H92.03 ACUTE OTALGIA, BILATERAL: ICD-10-CM

## 2024-10-23 DIAGNOSIS — H69.93 DYSFUNCTION OF BOTH EUSTACHIAN TUBES: ICD-10-CM

## 2024-10-23 DIAGNOSIS — J30.89 ALLERGIC RHINITIS DUE TO OTHER ALLERGIC TRIGGER, UNSPECIFIED SEASONALITY: Primary | ICD-10-CM

## 2024-10-23 DIAGNOSIS — H61.21 IMPACTED CERUMEN, RIGHT EAR: ICD-10-CM

## 2024-10-23 PROCEDURE — 69210 REMOVE IMPACTED EAR WAX UNI: CPT | Performed by: OTOLARYNGOLOGY

## 2024-10-23 PROCEDURE — G8484 FLU IMMUNIZE NO ADMIN: HCPCS | Performed by: OTOLARYNGOLOGY

## 2024-10-23 PROCEDURE — G8427 DOCREV CUR MEDS BY ELIG CLIN: HCPCS | Performed by: OTOLARYNGOLOGY

## 2024-10-23 PROCEDURE — G8417 CALC BMI ABV UP PARAM F/U: HCPCS | Performed by: OTOLARYNGOLOGY

## 2024-10-23 PROCEDURE — 3074F SYST BP LT 130 MM HG: CPT | Performed by: OTOLARYNGOLOGY

## 2024-10-23 PROCEDURE — 3078F DIAST BP <80 MM HG: CPT | Performed by: OTOLARYNGOLOGY

## 2024-10-23 PROCEDURE — 1090F PRES/ABSN URINE INCON ASSESS: CPT | Performed by: OTOLARYNGOLOGY

## 2024-10-23 PROCEDURE — 99213 OFFICE O/P EST LOW 20 MIN: CPT | Performed by: OTOLARYNGOLOGY

## 2024-10-23 PROCEDURE — 1159F MED LIST DOCD IN RCRD: CPT | Performed by: OTOLARYNGOLOGY

## 2024-10-23 PROCEDURE — 3017F COLORECTAL CA SCREEN DOC REV: CPT | Performed by: OTOLARYNGOLOGY

## 2024-10-23 PROCEDURE — 1036F TOBACCO NON-USER: CPT | Performed by: OTOLARYNGOLOGY

## 2024-10-23 PROCEDURE — 1123F ACP DISCUSS/DSCN MKR DOCD: CPT | Performed by: OTOLARYNGOLOGY

## 2024-10-23 PROCEDURE — G8400 PT W/DXA NO RESULTS DOC: HCPCS | Performed by: OTOLARYNGOLOGY

## 2024-10-23 NOTE — PROGRESS NOTES
negative.      Gait: Gait is intact.   Psychiatric:         Mood and Affect: Mood normal.         Behavior: Behavior normal. Behavior is cooperative.        Procedure - Removal Impacted Cerumen    Indications: cerumen impaction    Ears are examined under microscope/headlight.  right ears are cleaned using otologic curette, suction, and/or alligator forceps.    Assessment/Plan:       ICD-10-CM    1. Allergic rhinitis due to other allergic trigger, unspecified seasonality  J30.89       2. Acute otalgia, bilateral  H92.03       3. Dysfunction of both eustachian tubes  H69.93       4. Impacted cerumen, right ear  H61.21 REMOVE IMPACTED EAR WAX        Is overall doing well on IT, she remains on biweekly and wants to continue.    Medications remain stable.    Right ear cleaned    Otalgia can be fibromyalgia,, ear fullness likely multifactorial    Fu 6 mos

## 2024-12-11 ENCOUNTER — ANESTHESIA EVENT (OUTPATIENT)
Facility: HOSPITAL | Age: 74
End: 2024-12-11
Payer: MEDICARE

## 2024-12-11 RX ORDER — NALOXONE HYDROCHLORIDE 0.4 MG/ML
INJECTION, SOLUTION INTRAMUSCULAR; INTRAVENOUS; SUBCUTANEOUS PRN
Status: CANCELLED | OUTPATIENT
Start: 2024-12-11

## 2024-12-11 RX ORDER — SODIUM CHLORIDE 0.9 % (FLUSH) 0.9 %
5-40 SYRINGE (ML) INJECTION PRN
Status: CANCELLED | OUTPATIENT
Start: 2024-12-11

## 2024-12-11 RX ORDER — SODIUM CHLORIDE 9 MG/ML
INJECTION, SOLUTION INTRAVENOUS PRN
Status: CANCELLED | OUTPATIENT
Start: 2024-12-11

## 2024-12-11 RX ORDER — SODIUM CHLORIDE 0.9 % (FLUSH) 0.9 %
5-40 SYRINGE (ML) INJECTION EVERY 12 HOURS SCHEDULED
Status: CANCELLED | OUTPATIENT
Start: 2024-12-11

## 2024-12-12 ENCOUNTER — HOSPITAL ENCOUNTER (OUTPATIENT)
Facility: HOSPITAL | Age: 74
Setting detail: OUTPATIENT SURGERY
Discharge: HOME | End: 2024-12-12
Attending: INTERNAL MEDICINE | Admitting: INTERNAL MEDICINE
Payer: MEDICARE

## 2024-12-12 ENCOUNTER — ANESTHESIA (OUTPATIENT)
Facility: HOSPITAL | Age: 74
End: 2024-12-12
Payer: MEDICARE

## 2024-12-12 VITALS
HEIGHT: 66 IN | BODY MASS INDEX: 24.14 KG/M2 | HEART RATE: 59 BPM | OXYGEN SATURATION: 99 % | RESPIRATION RATE: 18 BRPM | TEMPERATURE: 98 F | SYSTOLIC BLOOD PRESSURE: 115 MMHG | DIASTOLIC BLOOD PRESSURE: 63 MMHG | WEIGHT: 150.2 LBS

## 2024-12-12 PROCEDURE — 3700000001 HC ADD 15 MINUTES (ANESTHESIA): Performed by: INTERNAL MEDICINE

## 2024-12-12 PROCEDURE — 2709999900 HC NON-CHARGEABLE SUPPLY: Performed by: INTERNAL MEDICINE

## 2024-12-12 PROCEDURE — 6360000002 HC RX W HCPCS

## 2024-12-12 PROCEDURE — C1713 ANCHOR/SCREW BN/BN,TIS/BN: HCPCS | Performed by: INTERNAL MEDICINE

## 2024-12-12 PROCEDURE — 3700000000 HC ANESTHESIA ATTENDED CARE: Performed by: INTERNAL MEDICINE

## 2024-12-12 PROCEDURE — 7100000010 HC PHASE II RECOVERY - FIRST 15 MIN: Performed by: INTERNAL MEDICINE

## 2024-12-12 PROCEDURE — 3600007502: Performed by: INTERNAL MEDICINE

## 2024-12-12 PROCEDURE — 2580000003 HC RX 258: Performed by: INTERNAL MEDICINE

## 2024-12-12 PROCEDURE — 3600007512: Performed by: INTERNAL MEDICINE

## 2024-12-12 PROCEDURE — 7100000011 HC PHASE II RECOVERY - ADDTL 15 MIN: Performed by: INTERNAL MEDICINE

## 2024-12-12 PROCEDURE — C1726 CATH, BAL DIL, NON-VASCULAR: HCPCS | Performed by: INTERNAL MEDICINE

## 2024-12-12 PROCEDURE — 2500000003 HC RX 250 WO HCPCS

## 2024-12-12 RX ORDER — PROPOFOL 10 MG/ML
INJECTION, EMULSION INTRAVENOUS
Status: DISCONTINUED | OUTPATIENT
Start: 2024-12-12 | End: 2024-12-12 | Stop reason: SDUPTHER

## 2024-12-12 RX ORDER — SODIUM CHLORIDE 9 MG/ML
INJECTION, SOLUTION INTRAVENOUS CONTINUOUS
Status: DISCONTINUED | OUTPATIENT
Start: 2024-12-12 | End: 2024-12-12 | Stop reason: HOSPADM

## 2024-12-12 RX ORDER — LIDOCAINE HYDROCHLORIDE 20 MG/ML
INJECTION, SOLUTION EPIDURAL; INFILTRATION; INTRACAUDAL; PERINEURAL
Status: DISCONTINUED | OUTPATIENT
Start: 2024-12-12 | End: 2024-12-12 | Stop reason: SDUPTHER

## 2024-12-12 RX ORDER — DEXMEDETOMIDINE HYDROCHLORIDE 100 UG/ML
INJECTION, SOLUTION INTRAVENOUS
Status: DISCONTINUED | OUTPATIENT
Start: 2024-12-12 | End: 2024-12-12 | Stop reason: SDUPTHER

## 2024-12-12 RX ADMIN — PROPOFOL 30 MG: 10 INJECTION, EMULSION INTRAVENOUS at 14:11

## 2024-12-12 RX ADMIN — PROPOFOL 20 MG: 10 INJECTION, EMULSION INTRAVENOUS at 14:13

## 2024-12-12 RX ADMIN — PROPOFOL 20 MG: 10 INJECTION, EMULSION INTRAVENOUS at 14:17

## 2024-12-12 RX ADMIN — PROPOFOL 30 MG: 10 INJECTION, EMULSION INTRAVENOUS at 14:15

## 2024-12-12 RX ADMIN — LIDOCAINE HYDROCHLORIDE 80 MG: 20 INJECTION, SOLUTION EPIDURAL; INFILTRATION; INTRACAUDAL; PERINEURAL at 14:09

## 2024-12-12 RX ADMIN — PROPOFOL 20 MG: 10 INJECTION, EMULSION INTRAVENOUS at 14:23

## 2024-12-12 RX ADMIN — PROPOFOL 30 MG: 10 INJECTION, EMULSION INTRAVENOUS at 14:19

## 2024-12-12 RX ADMIN — DEXMEDETOMIDINE HYDROCHLORIDE 5 MCG: 100 INJECTION, SOLUTION INTRAVENOUS at 14:09

## 2024-12-12 RX ADMIN — PROPOFOL 20 MG: 10 INJECTION, EMULSION INTRAVENOUS at 14:25

## 2024-12-12 RX ADMIN — PROPOFOL 20 MG: 10 INJECTION, EMULSION INTRAVENOUS at 14:21

## 2024-12-12 RX ADMIN — SODIUM CHLORIDE: 9 INJECTION, SOLUTION INTRAVENOUS at 13:12

## 2024-12-12 RX ADMIN — PROPOFOL 50 MG: 10 INJECTION, EMULSION INTRAVENOUS at 14:09

## 2024-12-12 ASSESSMENT — PAIN - FUNCTIONAL ASSESSMENT
PAIN_FUNCTIONAL_ASSESSMENT: NONE - DENIES PAIN
PAIN_FUNCTIONAL_ASSESSMENT: NONE - DENIES PAIN
PAIN_FUNCTIONAL_ASSESSMENT: 0-10

## 2024-12-12 NOTE — DISCHARGE INSTRUCTIONS
Marni CERVANTES Paras  [id number]  1950    COLON / EGD DISCHARGE INSTRUCTIONS    Discomfort:  Sore throat- throat lozenges or warm salt water gargle  Redness at IV site- apply warm compress to area; if redness or soreness persist- contact your physician  There may be a slight amount of blood passed from the rectum  Gaseous discomfort- walking, belching will help relieve any discomfort  You should note operate a vehicle for 12 hours  You should not engage in an occupation involving machinery or appliances for rest of today  You may note drink alcoholic beverages for at least 12 hours  Avoid making any critical decisions for at least 24 hour  DIET:   Regular Diet   - however -  remember your colon is empty and a heavy meal will produce gas.   Avoid these foods:  vegetables, fried / greasy foods, carbonated drinks for today     ACTIVITY:  You may resume your normal daily activities it is recommended that you spend the remainder of the day resting -  avoid any strenuous activity.    CALL M.D.  ANY SIGN OF:   Increasing pain, nausea, vomiting  Abdominal distension (swelling)  New increased bleeding (oral or rectal)  Fever (chills)  Pain in chest area  Bloody discharge from nose or mouth  Shortness of breath    Follow-up Instructions:  Recommendations:    - repeat colonoscopy on day with propofol with better prep, start Miralax 17gms in 8 oz water/juice daily one week prior, Gatorade/Miralax prep                      Marni C Paras  [id number]  1950          DISCHARGE SUMMARY from Nurse    PATIENT INSTRUCTIONS:    After general anesthesia or intravenous sedation, for 24 hours or while taking prescription Narcotics:  Limit your activities  Do not drive and operate hazardous machinery  Do not make important personal or business decisions  Do  not drink alcoholic beverages  If you have not urinated within 8 hours after discharge, please contact your surgeon on call.    Report the following to your surgeon:  Excessive

## 2024-12-12 NOTE — H&P
route  every day in the evening N      03/03/2022 Vitamin D2 1,250 mcg (50,000 unit) capsule TAKE 2 CAPSULES BY MOUTH ONCE WEEKLY N       Xarelto 10 mg tablet take 1 tablet by oral route  every day N       zolpidem 5 mg tablet Take 5 mg by Mouth Every Night at Bedtime. N        Allergies  Ingredient Reaction (Severity) Comment   MEPERIDINE HCL Hallucinations    NAPROXEN Sore throat symptom    PENICILLINS Skin lesion    PRESERVATIVE FREE Hallucinations      Reviewed, no changes.        Review of Systems  System Neg/Pos Details   Respiratory Negative Dyspnea.   Cardio Negative Edema and Irregular heartbeat/palpitations.   GI Positive Dysphagia.   GI Negative Change in bowel habits and Decreased appetite.       Vital Signs   Height  Time ft in cm Last Measured Height Position    2:15 PM 5.0 6.00 167.64 06/11/2024 0     Weight/BSA/BMI  Time lb oz kg Context BMI kg/m2 BSA m2    2:15 .00  68.946 dressed with shoes 24.53      Temperature/Pulse/Respiration  Time Temp F Temp C Temp Site Pulse/min Pattern Resp/ min    2:15 PM    78       Pulse Oximetry/FIO2  Time Pulse Ox (Rest %) Pulse Ox (Amb %) O2 Sat O2 L/Min Timing FiO2 % L/min Delivery Method Finger Probe    2:15 PM 89  RA           Measured by  Time Measured by    2:15 PM Vanessa Perez     Physical  Exam  Exam Findings Details   Constitutional Normal Well developed.   Eyes * Sclera - Right: anicteric, Left: anicteric.   Neck Exam Normal Inspection - Normal.   Respiratory * Inspection - Location: no increased WOB.   Respiratory Normal Auscultation - Normal.   Cardiovascular Normal Rhythm - Regular. Extra sounds - None. Murmurs - None.   Skin Normal Inspection - Normal.   Musculoskeletal Comments Ambulatory with walker   Psychiatric Normal Orientation - Oriented to time, place, person & situation. Appropriate mood and affect.   I  Pt seen and examined.  No interval change

## 2024-12-12 NOTE — ANESTHESIA PRE PROCEDURE
Department of Anesthesiology  Preprocedure Note       Name:  Marni Crain   Age:  74 y.o.  :  1950                                          MRN:  [id number]         Date:  2024      Surgeon: Surgeon(s):  Nat Corrigan MD    Procedure: Procedure(s):  ESOPHAGOGASTRODUODENOSCOPY DILATATION  COLONOSCOPY DIAGNOSTIC    Medications prior to admission:   Prior to Admission medications    Medication Sig Start Date End Date Taking? Authorizing Provider   Tuberculin-Allergy Syringes (BD ALLERGY SYRINGE) 27G X 1/2\" 0.5 ML MISC Use every 2 weeks for allergy injection 23   Christopher Mckee MD   hydrOXYzine HCl (ATARAX) 25 MG tablet TAKE ONE TABLET BY MOUTH THREE TIMES DAILY AS NEEDED FOR ITCHING 23   Christopher Mckee MD   albuterol sulfate HFA (PROVENTIL;VENTOLIN;PROAIR) 108 (90 Base) MCG/ACT inhaler Inhale into the lungs    Automatic Reconciliation, Ar   allopurinol (ZYLOPRIM) 300 MG tablet Take 100 mg by mouth 2 times daily    Automatic Reconciliation, Ar   betamethasone dipropionate 0.05 % cream Apply topically 2 times daily    Automatic Reconciliation, Ar   carvedilol (COREG) 3.125 MG tablet Take by mouth 2 times daily (with meals)    Automatic Reconciliation, Ar   celecoxib (CELEBREX) 200 MG capsule Take by mouth 2 times daily    Automatic Reconciliation, Ar   cholestyramine light (PREVALITE) 4 GM/DOSE powder Take by mouth 3 times daily (with meals)    Automatic Reconciliation, Ar   cyclobenzaprine (FLEXERIL) 10 MG tablet Take by mouth 3 times daily as needed    Automatic Reconciliation, Ar   dexlansoprazole (DEXILANT) 60 MG CPDR delayed release capsule Take by mouth    Automatic Reconciliation, Ar   diclofenac sodium (VOLTAREN) 1 % GEL Apply topically 4 times daily    Automatic Reconciliation, Ar   escitalopram (LEXAPRO) 10 MG tablet Take 20 mg by mouth daily    Automatic Reconciliation, Ar   etanercept (ENBREL) 50 MG/ML injection Inject into the skin    Automatic Reconciliation, Ar   famotidine

## 2024-12-12 NOTE — ANESTHESIA POSTPROCEDURE EVALUATION
Department of Anesthesiology  Postprocedure Note    Patient: Marni Crain  MRN: [id number]  YOB: 1950  Date of evaluation: 12/12/2024    Procedure Summary       Date: 12/12/24 Room / Location: [place] [address] / [facility] ENDOSCOPY    Anesthesia Start: 1405 Anesthesia Stop: 1429    Procedures:       ESOPHAGOGASTRODUODENOSCOPY; BALLOON DILATATION @ 18 with pull-through (Upper GI Region)      COLONOSCOPY DIAGNOSTIC (Lower GI Region) Diagnosis:       Constant low-grade dysphagia      Constipation, unspecified constipation type      (Constant low-grade dysphagia [R13.19])      (Constipation, unspecified constipation type [K59.00])    Surgeons: Nat Corrigan MD Responsible Provider: Smooth Rawls MD    Anesthesia Type: MAC ASA Status: 3            Anesthesia Type: MAC    Karyn Phase I: Karyn Score: 10    Karyn Phase II:      Anesthesia Post Evaluation    Level of consciousness: awake  Airway patency: patent  Nausea & Vomiting: no nausea and no vomiting  Cardiovascular status: blood pressure returned to baseline  Respiratory status: acceptable and room air  Hydration status: euvolemic  Pain management: adequate    No notable events documented.

## 2025-02-26 ENCOUNTER — TELEPHONE (OUTPATIENT)
Age: 75
End: 2025-02-26

## 2025-03-17 ENCOUNTER — NURSE ONLY (OUTPATIENT)
Age: 75
End: 2025-03-17
Payer: MEDICARE

## 2025-03-17 VITALS — SYSTOLIC BLOOD PRESSURE: 116 MMHG | DIASTOLIC BLOOD PRESSURE: 64 MMHG

## 2025-03-17 DIAGNOSIS — J30.1 ALLERGIC RHINITIS DUE TO POLLEN, UNSPECIFIED SEASONALITY: ICD-10-CM

## 2025-03-17 DIAGNOSIS — J30.89 ALLERGIC RHINITIS DUE TO OTHER ALLERGIC TRIGGER, UNSPECIFIED SEASONALITY: Primary | ICD-10-CM

## 2025-03-17 PROCEDURE — 95117 IMMUNOTHERAPY INJECTIONS: CPT | Performed by: OTOLARYNGOLOGY

## 2025-04-23 ENCOUNTER — OFFICE VISIT (OUTPATIENT)
Age: 75
End: 2025-04-23

## 2025-04-23 VITALS — SYSTOLIC BLOOD PRESSURE: 102 MMHG | DIASTOLIC BLOOD PRESSURE: 80 MMHG | BODY MASS INDEX: 24.86 KG/M2 | WEIGHT: 154 LBS

## 2025-04-23 DIAGNOSIS — J30.89 ALLERGIC RHINITIS DUE TO OTHER ALLERGIC TRIGGER, UNSPECIFIED SEASONALITY: Primary | ICD-10-CM

## 2025-04-23 DIAGNOSIS — L50.9 URTICARIA: ICD-10-CM

## 2025-04-23 DIAGNOSIS — H61.22 IMPACTED CERUMEN OF LEFT EAR: ICD-10-CM

## 2025-04-23 DIAGNOSIS — H69.93 DYSFUNCTION OF BOTH EUSTACHIAN TUBES: ICD-10-CM

## 2025-04-23 RX ORDER — APIXABAN 5 MG/1
5 TABLET, FILM COATED ORAL 2 TIMES DAILY
COMMUNITY
Start: 2025-04-07

## 2025-04-23 NOTE — PROGRESS NOTES
Subjective:    Marni Crain   72 y.o.   1950     Followup visit    Location - ear, nose    Quality - allergies, ear pain    Severity -  Mild/moderate    Duration - years    Timing - ongoing    Context - pt on IT, every other week now with concentrate, doing well nasal sore is better; having some left ear pain from her shoulder/neck pains    Modifying Features - allergies    Associated symptoms/signs -  Burning mouth    5/7/21 - 8 mos f/u pt is on biweekly IT overall doing well; still with occ left ear pain and soreness; she c/o occ itching under her skin on arms mostly    10/12/21 - 5 mos f/u - pt c/o \"not feeling well\" she is c/o left ear pain, more nasal congestion; not much drainage; went to urgent care got doxy and was told right ear perforation and fluid on left; side, not getting better    4/27/2022 -6-month follow-up; pt has overall been doing well on q2 weekly IT; no major flares of symptoms lately.  She has been dealing with a sore around her left nostril.  She needs more needles for injections.    1/11/23  1 week of sinus pressure PND, yellow drainage. OTCs not helping.  On IT every 2 weeks still    3/22/2023  2-month follow-up - she finished off prior abx - was sick again 2 weeks ago went to PCP and got abx and steroid again.  She is coughing less but still feels mucus in throat that she cannot clear.  She is having more left ear pressure as well.  Occ more sinus pressure on left    12/19/23  9 mos followup - she is using a walker now, out of walking book after foot surgery.  C/o some intermittent earache but no recent sinus infections.  Did not end up getting a CT scan.  Remains on q2 weekly IT.  Some pain tip of tongue.    10/23/24  10 mos fu - pt states overall ok - she does occ have a feeling where she would be \"hearing in a barrel\" may last 30 min-2 hours; also separately she has episodes of external ear soreness where she cannot lay on the ears    4/23/25  6 mos fu - still having similar

## 2025-04-23 NOTE — PROGRESS NOTES
Identified pt with two pt identifiers(name and ). Reviewed record in preparation for visit and have obtained necessary documentation. All patient medications has been reviewed.  Chief Complaint   Patient presents with    Follow-up     6 months Allergic rhinitis due to other allergic trigger. Pt stated she still has a echo sound in her left ear along with dryness.        Vitals:    25 1259   BP: 102/80                   Coordination of Care Questionnaire:   1) Have you been to an emergency room, urgent care, or hospitalized since your last visit?   no       2. Have seen or consulted any other health care provider since your last visit? no        Patient is accompanied by self I have received verbal consent from Marni Crain to discuss any/all medical information while they are present in the room.

## 2025-08-01 ENCOUNTER — NURSE ONLY (OUTPATIENT)
Age: 75
End: 2025-08-01

## 2025-08-01 NOTE — PROGRESS NOTES
Total Vials prepared 2  See scanned serum mixing sheet for vial atigens  Allergy extract was prepared according to written prescription by provider  Patient vials labeled with name, date of birth, vial number and expiration date  Injections given according to escalation on attached sheet and provider direction , see scanned mixing sheet  See scanned Allergy Flowsheet for injection documentation from each visit

## 2025-08-04 ENCOUNTER — CLINICAL SUPPORT (OUTPATIENT)
Age: 75
End: 2025-08-04
Payer: MEDICARE

## 2025-08-04 DIAGNOSIS — J30.89 ALLERGIC RHINITIS DUE TO OTHER ALLERGIC TRIGGER, UNSPECIFIED SEASONALITY: Primary | ICD-10-CM

## 2025-08-04 PROCEDURE — 95117 IMMUNOTHERAPY INJECTIONS: CPT | Performed by: OTOLARYNGOLOGY

## (undated) DEVICE — BLUNTFILL: Brand: MONOJECT

## (undated) DEVICE — TUBING, SUCTION, 1/4" X 12', STRAIGHT: Brand: MEDLINE

## (undated) DEVICE — ESOPHAGEAL/PYLORIC/COLONIC/BILIARY WIREGUIDED BALLOON DILATATION CATHETER: Brand: CRE™ PRO

## (undated) DEVICE — SYRINGE MEDICAL 3ML CLEAR PLASTIC STANDARD NON CONTROL LUERLOCK TIP DISPOSABLE

## (undated) DEVICE — Device

## (undated) DEVICE — EJECTOR SALIVA 6 IN FLX CLR

## (undated) DEVICE — DEVICE INFL 60ML 12ATM CONVENIENT LOK REL HNDL HI PRSS FLX

## (undated) DEVICE — MOUTHPIECE ENDOSCP L CTRL OPN AND SIDE PORTS DISP

## (undated) DEVICE — 4-PORT MANIFOLD: Brand: NEPTUNE 2

## (undated) DEVICE — SOLUTION IV 1000ML 20MEQ K CHL IN 0.9% SOD CHL FLX CONT

## (undated) DEVICE — CATHETER IV 22GA L1IN BLU POLYUR STR HUB RADPQ PROTCT +

## (undated) DEVICE — WRISTBAND ID AD W2.5XL9.5CM RED VYN ADH CLSR UNI-PRINT

## (undated) DEVICE — SYRINGE MED 50ML LUERSLIP TIP

## (undated) DEVICE — CANNULA CUSH AD W/ 14FT TBG

## (undated) DEVICE — TOURNIQUET PHLEB W1XL18IN BLU FLAT RL AND BND REUSE FOR IV

## (undated) DEVICE — CATHETER PH SUCT 14FR

## (undated) DEVICE — SYRINGE 50ML E/T

## (undated) DEVICE — KENDALL RADIOLUCENT FOAM MONITORING ELECTRODE RECTANGULAR SHAPE: Brand: KENDALL

## (undated) DEVICE — ENDO CARRY-ON PROCEDURE KIT INCLUDES ENZYMATIC SPONGE, GAUZE, BIOHAZARD LABEL, TRAY, LUBRICANT, DIRTY SCOPE LABEL, WATER LABEL, TRAY, DRAWSTRING PAD, AND DEFENDO 4-PIECE KIT.: Brand: ENDO CARRY-ON PROCEDURE KIT

## (undated) DEVICE — SYRINGE MED 5ML STD CLR PLAS LUERLOCK TIP N CTRL DISP

## (undated) DEVICE — NEEDLE SYRINGE 18GA L1.5IN RED PLAS HUB S STL BLNT FILL W/O